# Patient Record
Sex: FEMALE | Race: WHITE | Employment: OTHER | URBAN - METROPOLITAN AREA
[De-identification: names, ages, dates, MRNs, and addresses within clinical notes are randomized per-mention and may not be internally consistent; named-entity substitution may affect disease eponyms.]

---

## 2019-12-05 ENCOUNTER — HOSPITAL ENCOUNTER (OUTPATIENT)
Age: 79
Discharge: HOME | End: 2019-12-05
Attending: EMERGENCY MEDICINE
Payer: MEDICARE

## 2019-12-05 ENCOUNTER — APPOINTMENT (OUTPATIENT)
Dept: GENERAL RADIOLOGY | Age: 79
End: 2019-12-05
Attending: EMERGENCY MEDICINE
Payer: MEDICARE

## 2019-12-05 VITALS
WEIGHT: 180 LBS | TEMPERATURE: 98 F | SYSTOLIC BLOOD PRESSURE: 161 MMHG | RESPIRATION RATE: 18 BRPM | BODY MASS INDEX: 29.99 KG/M2 | DIASTOLIC BLOOD PRESSURE: 56 MMHG | HEIGHT: 65 IN | HEART RATE: 70 BPM | OXYGEN SATURATION: 100 %

## 2019-12-05 DIAGNOSIS — M51.36 DEGENERATIVE DISC DISEASE, LUMBAR: ICD-10-CM

## 2019-12-05 DIAGNOSIS — M54.40 BACK PAIN OF LUMBAR REGION WITH SCIATICA: Primary | ICD-10-CM

## 2019-12-05 PROCEDURE — 72100 X-RAY EXAM L-S SPINE 2/3 VWS: CPT | Performed by: EMERGENCY MEDICINE

## 2019-12-05 PROCEDURE — 73502 X-RAY EXAM HIP UNI 2-3 VIEWS: CPT | Performed by: EMERGENCY MEDICINE

## 2019-12-05 PROCEDURE — 99203 OFFICE O/P NEW LOW 30 MIN: CPT

## 2019-12-05 PROCEDURE — 99204 OFFICE O/P NEW MOD 45 MIN: CPT

## 2019-12-05 RX ORDER — METHYLPREDNISOLONE 4 MG/1
TABLET ORAL
Qty: 1 PACKAGE | Refills: 0 | Status: SHIPPED | OUTPATIENT
Start: 2019-12-05 | End: 2020-01-18

## 2019-12-05 RX ORDER — TRAMADOL HYDROCHLORIDE 50 MG/1
TABLET ORAL EVERY 6 HOURS PRN
Qty: 10 TABLET | Refills: 0 | Status: SHIPPED | OUTPATIENT
Start: 2019-12-05 | End: 2019-12-12

## 2019-12-05 NOTE — ED PROVIDER NOTES
Patient Seen in: [address]      History   Patient presents with:  Back Pain    Stated Complaint: back pain    HPI    Patient is a 67-year-old female with a history of mild hypertension and high cholesterol who presents Genitourinary: Negative for decreased urine volume, difficulty urinating, dysuria, flank pain, frequency and hematuria. Musculoskeletal: Positive for back pain. Negative for arthralgias and gait problem. Skin: Negative for rash.    Neurological: Negativ Comments: Numbness to the left paraspinal lumbar region with radiation into the left buttock. Skin:     General: Skin is warm. Capillary Refill: Capillary refill takes less than 2 seconds. Neurological:      General: No focal deficit present.

## 2019-12-05 NOTE — ED INITIAL ASSESSMENT (HPI)
Uncomfortable sleeping last two nights, unable to lay down. Left lower back pain that radiates around to front left groin to top of thigh down to knee. Denies numbness to leg.  Took tylenol at 7:00am.

## 2019-12-10 PROBLEM — M19.072 PRIMARY OSTEOARTHRITIS OF LEFT FOOT: Status: ACTIVE | Noted: 2019-12-10

## 2020-01-09 ENCOUNTER — HOSPITAL (OUTPATIENT)
Dept: OTHER | Age: 80
End: 2020-01-09
Attending: INTERNAL MEDICINE

## 2020-01-18 RX ORDER — GABAPENTIN 300 MG/1
300 CAPSULE ORAL 2 TIMES DAILY
COMMUNITY
End: 2020-12-07 | Stop reason: ALTCHOICE

## 2020-01-18 RX ORDER — METOPROLOL SUCCINATE 100 MG/1
100 TABLET, EXTENDED RELEASE ORAL DAILY
COMMUNITY
End: 2020-12-11

## 2020-01-19 ENCOUNTER — LAB ENCOUNTER (OUTPATIENT)
Dept: LAB | Facility: HOSPITAL | Age: 80
End: 2020-01-19
Attending: ANESTHESIOLOGY
Payer: MEDICARE

## 2020-01-19 DIAGNOSIS — Z01.818 PRE-OP TESTING: ICD-10-CM

## 2020-01-19 PROCEDURE — 93005 ELECTROCARDIOGRAM TRACING: CPT

## 2020-01-19 PROCEDURE — 93010 ELECTROCARDIOGRAM REPORT: CPT | Performed by: ANESTHESIOLOGY

## 2020-01-21 ENCOUNTER — ANESTHESIA (OUTPATIENT)
Dept: MRI IMAGING | Facility: HOSPITAL | Age: 80
End: 2020-01-21
Payer: MEDICARE

## 2020-01-21 ENCOUNTER — ANESTHESIA EVENT (OUTPATIENT)
Dept: MRI IMAGING | Facility: HOSPITAL | Age: 80
End: 2020-01-21
Payer: MEDICARE

## 2020-01-21 ENCOUNTER — HOSPITAL ENCOUNTER (OUTPATIENT)
Dept: MRI IMAGING | Facility: HOSPITAL | Age: 80
Discharge: HOME | End: 2020-01-21
Attending: INTERNAL MEDICINE
Payer: MEDICARE

## 2020-01-21 VITALS
HEART RATE: 55 BPM | OXYGEN SATURATION: 97 % | BODY MASS INDEX: 30.37 KG/M2 | DIASTOLIC BLOOD PRESSURE: 69 MMHG | HEIGHT: 66 IN | WEIGHT: 189 LBS | SYSTOLIC BLOOD PRESSURE: 130 MMHG | RESPIRATION RATE: 14 BRPM

## 2020-01-21 DIAGNOSIS — M54.16 LUMBAR RADICULOPATHY: ICD-10-CM

## 2020-01-21 DIAGNOSIS — Z01.818 PRE-OP TESTING: Primary | ICD-10-CM

## 2020-01-21 PROCEDURE — 72148 MRI LUMBAR SPINE W/O DYE: CPT | Performed by: INTERNAL MEDICINE

## 2020-01-21 RX ORDER — LIDOCAINE HYDROCHLORIDE 10 MG/ML
INJECTION, SOLUTION EPIDURAL; INFILTRATION; INTRACAUDAL; PERINEURAL AS NEEDED
Status: DISCONTINUED | OUTPATIENT
Start: 2020-01-21 | End: 2020-01-21 | Stop reason: SURG

## 2020-01-21 RX ORDER — HYDROCODONE BITARTRATE AND ACETAMINOPHEN 5; 325 MG/1; MG/1
1 TABLET ORAL AS NEEDED
Status: DISCONTINUED | OUTPATIENT
Start: 2020-01-21 | End: 2020-01-23

## 2020-01-21 RX ORDER — FAMOTIDINE 20 MG/1
20 TABLET ORAL ONCE
Status: COMPLETED | OUTPATIENT
Start: 2020-01-21 | End: 2020-01-21

## 2020-01-21 RX ORDER — HALOPERIDOL 5 MG/ML
0.25 INJECTION INTRAMUSCULAR ONCE AS NEEDED
Status: ACTIVE | OUTPATIENT
Start: 2020-01-21 | End: 2020-01-21

## 2020-01-21 RX ORDER — PROCHLORPERAZINE EDISYLATE 5 MG/ML
5 INJECTION INTRAMUSCULAR; INTRAVENOUS ONCE AS NEEDED
Status: ACTIVE | OUTPATIENT
Start: 2020-01-21 | End: 2020-01-21

## 2020-01-21 RX ORDER — HYDROMORPHONE HYDROCHLORIDE 1 MG/ML
0.6 INJECTION, SOLUTION INTRAMUSCULAR; INTRAVENOUS; SUBCUTANEOUS EVERY 5 MIN PRN
Status: DISCONTINUED | OUTPATIENT
Start: 2020-01-21 | End: 2020-01-23

## 2020-01-21 RX ORDER — ACETAMINOPHEN 500 MG
1000 TABLET ORAL ONCE
Status: DISCONTINUED | OUTPATIENT
Start: 2020-01-21 | End: 2020-01-23

## 2020-01-21 RX ORDER — METOCLOPRAMIDE 10 MG/1
10 TABLET ORAL ONCE
Status: COMPLETED | OUTPATIENT
Start: 2020-01-21 | End: 2020-01-21

## 2020-01-21 RX ORDER — HYDROMORPHONE HYDROCHLORIDE 1 MG/ML
0.4 INJECTION, SOLUTION INTRAMUSCULAR; INTRAVENOUS; SUBCUTANEOUS EVERY 5 MIN PRN
Status: DISCONTINUED | OUTPATIENT
Start: 2020-01-21 | End: 2020-01-23

## 2020-01-21 RX ORDER — SODIUM CHLORIDE, SODIUM LACTATE, POTASSIUM CHLORIDE, CALCIUM CHLORIDE 600; 310; 30; 20 MG/100ML; MG/100ML; MG/100ML; MG/100ML
INJECTION, SOLUTION INTRAVENOUS CONTINUOUS
Status: DISCONTINUED | OUTPATIENT
Start: 2020-01-21 | End: 2020-01-23

## 2020-01-21 RX ORDER — MORPHINE SULFATE 4 MG/ML
4 INJECTION, SOLUTION INTRAMUSCULAR; INTRAVENOUS EVERY 10 MIN PRN
Status: DISCONTINUED | OUTPATIENT
Start: 2020-01-21 | End: 2020-01-23

## 2020-01-21 RX ORDER — MORPHINE SULFATE 4 MG/ML
2 INJECTION, SOLUTION INTRAMUSCULAR; INTRAVENOUS EVERY 10 MIN PRN
Status: DISCONTINUED | OUTPATIENT
Start: 2020-01-21 | End: 2020-01-23

## 2020-01-21 RX ORDER — NALOXONE HYDROCHLORIDE 0.4 MG/ML
80 INJECTION, SOLUTION INTRAMUSCULAR; INTRAVENOUS; SUBCUTANEOUS AS NEEDED
Status: ACTIVE | OUTPATIENT
Start: 2020-01-21 | End: 2020-01-21

## 2020-01-21 RX ORDER — HYDROMORPHONE HYDROCHLORIDE 1 MG/ML
0.2 INJECTION, SOLUTION INTRAMUSCULAR; INTRAVENOUS; SUBCUTANEOUS EVERY 5 MIN PRN
Status: DISCONTINUED | OUTPATIENT
Start: 2020-01-21 | End: 2020-01-23

## 2020-01-21 RX ORDER — HYDROCODONE BITARTRATE AND ACETAMINOPHEN 5; 325 MG/1; MG/1
2 TABLET ORAL AS NEEDED
Status: DISCONTINUED | OUTPATIENT
Start: 2020-01-21 | End: 2020-01-23

## 2020-01-21 RX ORDER — METOPROLOL TARTRATE 5 MG/5ML
2.5 INJECTION INTRAVENOUS ONCE
Status: DISCONTINUED | OUTPATIENT
Start: 2020-01-21 | End: 2020-01-23

## 2020-01-21 RX ORDER — MORPHINE SULFATE 10 MG/ML
6 INJECTION, SOLUTION INTRAMUSCULAR; INTRAVENOUS EVERY 10 MIN PRN
Status: DISCONTINUED | OUTPATIENT
Start: 2020-01-21 | End: 2020-01-23

## 2020-01-21 RX ORDER — ONDANSETRON 2 MG/ML
4 INJECTION INTRAMUSCULAR; INTRAVENOUS ONCE AS NEEDED
Status: ACTIVE | OUTPATIENT
Start: 2020-01-21 | End: 2020-01-21

## 2020-01-21 RX ADMIN — FAMOTIDINE 20 MG: 20 TABLET ORAL at 12:20:00

## 2020-01-21 RX ADMIN — METOCLOPRAMIDE 10 MG: 10 TABLET ORAL at 12:21:00

## 2020-01-21 RX ADMIN — SODIUM CHLORIDE, SODIUM LACTATE, POTASSIUM CHLORIDE, CALCIUM CHLORIDE: 600; 310; 30; 20 INJECTION, SOLUTION INTRAVENOUS at 14:51:00

## 2020-01-21 RX ADMIN — SODIUM CHLORIDE, SODIUM LACTATE, POTASSIUM CHLORIDE, CALCIUM CHLORIDE: 600; 310; 30; 20 INJECTION, SOLUTION INTRAVENOUS at 14:11:00

## 2020-01-21 RX ADMIN — SODIUM CHLORIDE, SODIUM LACTATE, POTASSIUM CHLORIDE, CALCIUM CHLORIDE: 600; 310; 30; 20 INJECTION, SOLUTION INTRAVENOUS at 12:40:00

## 2020-01-21 RX ADMIN — LIDOCAINE HYDROCHLORIDE 50 MG: 10 INJECTION, SOLUTION EPIDURAL; INFILTRATION; INTRACAUDAL; PERINEURAL at 14:11:00

## 2020-01-21 NOTE — ANESTHESIA PREPROCEDURE EVALUATION
Anesthesia PreOp Note    HPI:     Madhu Lopez is a 79year old female who presents for preoperative consultation requested by: * No surgeons listed *    Date of Surgery: 1/21/2020    * No procedures listed *  Indication: * No pre-op diagnosis ente mL/hr at 01/21/20 1240  acetaminophen (TYLENOL EXTRA STRENGTH) tab 1,000 mg, 1,000 mg, Oral, Once, Kushal HAJI MD  metoprolol Tartrate (LOPRESSOR) tab 25 mg, 25 mg, Oral, Once PRN, Preethi Lo MD Rudene Sidles Aubery Herbert*    HIVES  Sulfa Antibiot Not on file        Forced sexual activity: Not on file    Other Topics      Concerns:        Not on file    Social History Narrative      Not on file      Available pre-op labs reviewed. Vital Signs: Body mass index is 30.51 kg/m².    height is

## 2020-01-21 NOTE — ANESTHESIA POSTPROCEDURE EVALUATION
Patient: Kirti Obrien    Procedure Summary     Date:  01/21/20 Room / Location:  [facility] MRI; [address] Anesthesia Care Unit    Anesthesia Start:  1973 Anesthesia Stop:  8410    Procedure:  MRI SPINE LUMBAR (CPT=72148) Merlin Handing

## 2020-01-21 NOTE — ANESTHESIA PROCEDURE NOTES
Airway  Date/Time: 1/21/2020 1:57 PM  Urgency: Elective    Airway not difficult    General Information and Staff    Patient location during procedure: OR  Anesthesiologist: Alia Velasquez MD  Performed: anesthesiologist     Indications and Patient Condition

## 2020-01-29 ENCOUNTER — OFFICE VISIT (OUTPATIENT)
Dept: NEUROLOGY | Facility: CLINIC | Age: 80
End: 2020-01-29
Payer: MEDICARE

## 2020-01-29 ENCOUNTER — TELEPHONE (OUTPATIENT)
Dept: NEUROLOGY | Facility: CLINIC | Age: 80
End: 2020-01-29

## 2020-01-29 VITALS
BODY MASS INDEX: 28.93 KG/M2 | SYSTOLIC BLOOD PRESSURE: 130 MMHG | DIASTOLIC BLOOD PRESSURE: 80 MMHG | HEIGHT: 66 IN | WEIGHT: 180 LBS

## 2020-01-29 DIAGNOSIS — M48.062 SPINAL STENOSIS OF LUMBAR REGION WITH NEUROGENIC CLAUDICATION: Primary | ICD-10-CM

## 2020-01-29 PROCEDURE — 99204 OFFICE O/P NEW MOD 45 MIN: CPT | Performed by: PHYSICAL MEDICINE & REHABILITATION

## 2020-01-29 NOTE — TELEPHONE ENCOUNTER
Patient has been scheduled for a Left L3 (L3-4), L4 (L4-5) transforaminal epidural steroid injections   on 02/13/20 at the [facility]. Medications and allergies reviewed.  Patient informed to hold aspirins, nsaids, blood thinners, multivitamins, vitamin E and fis

## 2020-01-29 NOTE — TELEPHONE ENCOUNTER
Left L3 (L3-4), L4 (L4-5) TFESI-APPROVED    Medicare Online for authorization of procedure   Left L3 (L3-4), L4 (L4-5) TFESI CPT codes: 38893,13501. Procedure is a covered benefit and does not require authorization. Will inform Nursing.

## 2020-01-29 NOTE — PROGRESS NOTES
[address]  Progress Note    CHIEF COMPLAINT:  Patient presents with:  Low Back Pain: Patient referred by Dr. Murali Sweeney presents for low back pain that started 12/05/19.  Patient states that she was putting ch History    Occupational History      Not on file    Tobacco Use      Smoking status: Former Smoker      Smokeless tobacco: Never Used      Tobacco comment: quit at age 41--smoked for 20yrs    Substance and Sexual Activity      Alcohol use: Yes        Comme Wt 180 lb (81.6 kg)   LMP 10/20/1993 (Exact Date)   BMI 29.05 kg/m²     Body mass index is 29.05 kg/m². General: No immediate distress  Head: Normocephalic/ Atraumatic  Eyes: Extra-occular movements intact.    Ears: No auricular hematoma or deformities

## 2020-01-30 ENCOUNTER — MED REC SCAN ONLY (OUTPATIENT)
Dept: NEUROLOGY | Facility: CLINIC | Age: 80
End: 2020-01-30

## 2020-02-04 PROBLEM — M48.062 SPINAL STENOSIS OF LUMBAR REGION WITH NEUROGENIC CLAUDICATION: Status: ACTIVE | Noted: 2020-02-04

## 2020-02-13 ENCOUNTER — OFFICE VISIT (OUTPATIENT)
Dept: SURGERY | Facility: CLINIC | Age: 80
End: 2020-02-13
Payer: MEDICARE

## 2020-02-13 DIAGNOSIS — M48.062 SPINAL STENOSIS OF LUMBAR REGION WITH NEUROGENIC CLAUDICATION: Primary | ICD-10-CM

## 2020-02-13 PROCEDURE — 64484 NJX AA&/STRD TFRM EPI L/S EA: CPT | Performed by: PHYSICAL MEDICINE & REHABILITATION

## 2020-02-13 PROCEDURE — 64483 NJX AA&/STRD TFRM EPI L/S 1: CPT | Performed by: PHYSICAL MEDICINE & REHABILITATION

## 2020-02-15 PROBLEM — I12.9 HYPERTENSIVE RENAL DISEASE: Status: ACTIVE | Noted: 2019-05-18

## 2020-02-15 PROBLEM — I48.91 ATRIAL FIBRILLATION (HCC): Status: ACTIVE | Noted: 2019-05-18

## 2020-02-15 NOTE — PROCEDURES
Preoperative Diagnosis:  (W30.715) Spinal stenosis of lumbar region with neurogenic claudication  (primary encounter diagnosis)       Postoperative Diagnosis:  (E13.801) Spinal stenosis of lumbar region with neurogenic claudication  (primary encounter diag

## 2020-04-29 ENCOUNTER — TELEPHONE (OUTPATIENT)
Dept: NEUROLOGY | Facility: CLINIC | Age: 80
End: 2020-04-29

## 2020-04-30 ENCOUNTER — TELEMEDICINE (OUTPATIENT)
Dept: NEUROLOGY | Facility: CLINIC | Age: 80
End: 2020-04-30

## 2020-04-30 DIAGNOSIS — I48.0 PAROXYSMAL ATRIAL FIBRILLATION (HCC): ICD-10-CM

## 2020-04-30 DIAGNOSIS — M96.1 POST LAMINECTOMY SYNDROME: Primary | ICD-10-CM

## 2020-04-30 DIAGNOSIS — N18.30 CHRONIC RENAL INSUFFICIENCY, STAGE III (MODERATE) (HCC): ICD-10-CM

## 2020-04-30 DIAGNOSIS — E66.3 PATIENT OVERWEIGHT: ICD-10-CM

## 2020-04-30 PROBLEM — M54.16 LUMBAR RADICULOPATHY: Status: ACTIVE | Noted: 2020-04-30

## 2020-04-30 PROCEDURE — 99213 OFFICE O/P EST LOW 20 MIN: CPT | Performed by: PHYSICAL MEDICINE & REHABILITATION

## 2020-04-30 RX ORDER — VALACYCLOVIR HYDROCHLORIDE 1 G/1
TABLET, FILM COATED ORAL
COMMUNITY
End: 2021-04-21

## 2020-04-30 RX ORDER — LORAZEPAM 1 MG/1
TABLET ORAL
COMMUNITY
End: 2020-09-21 | Stop reason: ALTCHOICE

## 2020-04-30 RX ORDER — MONTELUKAST SODIUM 10 MG/1
TABLET ORAL
COMMUNITY
End: 2020-09-21 | Stop reason: ALTCHOICE

## 2020-04-30 RX ORDER — FEXOFENADINE HCL 180 MG/1
TABLET ORAL
COMMUNITY
End: 2021-07-29

## 2020-04-30 RX ORDER — TRAMADOL HYDROCHLORIDE 50 MG/1
TABLET ORAL
COMMUNITY
End: 2020-09-21 | Stop reason: ALTCHOICE

## 2020-04-30 RX ORDER — EZETIMIBE 10 MG/1
TABLET ORAL
COMMUNITY
End: 2020-09-21

## 2020-04-30 RX ORDER — NITROFURANTOIN 25; 75 MG/1; MG/1
100 CAPSULE ORAL 2 TIMES DAILY
COMMUNITY
Start: 2020-01-07 | End: 2020-09-21 | Stop reason: ALTCHOICE

## 2020-04-30 NOTE — PROGRESS NOTES
[address] [facility]    Telemedicine Visit - New Evaluation    Stephanie Samueloscar Guthriemadalyn verbally consents to a Telemedicine Visit on 04/30/20.  This visit is conducted using Telemedicine with live, interactive audio and vi sitting alleviates it. Pain is approximately 4/10 and intermittent depending on activities.        PAST MEDICAL HISTORY:     Past Medical History:   Diagnosis Date   • Anesthesia complication    • Arrhythmia     atrial tachycardia   • Back problem    • HEA heartburn: denies    Genitourinary: Bladder incontinence: denies    Musculoskeletal: As per HPI    Neurological: As per HPI        PHYSICAL EXAM:   Constitutional: Healthy appearing, well-developed, no acute distress  Eyes: Conjunctivae are clear, extra-oc may significantly decrease immune response and may increase risk and complications of infection. The patient was advised that given the current situation with COVID-19, it is in his/her best interest to socially distance his/herself.  Given this, we are

## 2020-08-20 ENCOUNTER — APPOINTMENT (OUTPATIENT)
Dept: GENERAL RADIOLOGY | Facility: HOSPITAL | Age: 80
End: 2020-08-20
Payer: MEDICARE

## 2020-08-20 ENCOUNTER — HOSPITAL ENCOUNTER (EMERGENCY)
Facility: HOSPITAL | Age: 80
Discharge: HOME | End: 2020-08-20
Attending: EMERGENCY MEDICINE
Payer: MEDICARE

## 2020-08-20 VITALS
SYSTOLIC BLOOD PRESSURE: 157 MMHG | OXYGEN SATURATION: 98 % | TEMPERATURE: 98 F | HEART RATE: 56 BPM | DIASTOLIC BLOOD PRESSURE: 79 MMHG | WEIGHT: 185 LBS | HEIGHT: 65 IN | BODY MASS INDEX: 30.82 KG/M2 | RESPIRATION RATE: 18 BRPM

## 2020-08-20 DIAGNOSIS — R07.89 CHEST PRESSURE: Primary | ICD-10-CM

## 2020-08-20 DIAGNOSIS — I10 UNCONTROLLED HYPERTENSION: ICD-10-CM

## 2020-08-20 LAB
ANION GAP SERPL CALC-SCNC: 8 MMOL/L (ref 0–18)
BASOPHILS # BLD AUTO: 0.06 X10(3) UL (ref 0–0.2)
BASOPHILS NFR BLD AUTO: 0.8 %
BUN BLD-MCNC: 28 MG/DL (ref 7–18)
BUN/CREAT SERPL: 17.5 (ref 10–20)
CALCIUM BLD-MCNC: 9.2 MG/DL (ref 8.5–10.1)
CHLORIDE SERPL-SCNC: 91 MMOL/L (ref 98–112)
CO2 SERPL-SCNC: 27 MMOL/L (ref 21–32)
CREAT BLD-MCNC: 1.6 MG/DL (ref 0.55–1.02)
DEPRECATED RDW RBC AUTO: 47.2 FL (ref 35.1–46.3)
EOSINOPHIL # BLD AUTO: 0.15 X10(3) UL (ref 0–0.7)
EOSINOPHIL NFR BLD AUTO: 1.9 %
ERYTHROCYTE [DISTWIDTH] IN BLOOD BY AUTOMATED COUNT: 14.2 % (ref 11–15)
GLUCOSE BLD-MCNC: 97 MG/DL (ref 70–99)
HCT VFR BLD AUTO: 32.7 % (ref 35–48)
HGB BLD-MCNC: 11.3 G/DL (ref 12–16)
IMM GRANULOCYTES # BLD AUTO: 0.01 X10(3) UL (ref 0–1)
IMM GRANULOCYTES NFR BLD: 0.1 %
LYMPHOCYTES # BLD AUTO: 4.02 X10(3) UL (ref 1–4)
LYMPHOCYTES NFR BLD AUTO: 52 %
MCH RBC QN AUTO: 31.3 PG (ref 26–34)
MCHC RBC AUTO-ENTMCNC: 34.6 G/DL (ref 31–37)
MCV RBC AUTO: 90.6 FL (ref 80–100)
MONOCYTES # BLD AUTO: 0.62 X10(3) UL (ref 0.1–1)
MONOCYTES NFR BLD AUTO: 8 %
NEUTROPHILS # BLD AUTO: 2.87 X10 (3) UL (ref 1.5–7.7)
NEUTROPHILS # BLD AUTO: 2.87 X10(3) UL (ref 1.5–7.7)
NEUTROPHILS NFR BLD AUTO: 37.2 %
OSMOLALITY SERPL CALC.SUM OF ELEC: 267 MOSM/KG (ref 275–295)
PLATELET # BLD AUTO: 188 10(3)UL (ref 150–450)
POTASSIUM SERPL-SCNC: 5 MMOL/L (ref 3.5–5.1)
RBC # BLD AUTO: 3.61 X10(6)UL (ref 3.8–5.3)
SODIUM SERPL-SCNC: 126 MMOL/L (ref 136–145)
TROPONIN I SERPL-MCNC: <0.045 NG/ML (ref ?–0.04)
WBC # BLD AUTO: 7.7 X10(3) UL (ref 4–11)

## 2020-08-20 PROCEDURE — 85025 COMPLETE CBC W/AUTO DIFF WBC: CPT | Performed by: EMERGENCY MEDICINE

## 2020-08-20 PROCEDURE — 93005 ELECTROCARDIOGRAM TRACING: CPT

## 2020-08-20 PROCEDURE — 80048 BASIC METABOLIC PNL TOTAL CA: CPT | Performed by: EMERGENCY MEDICINE

## 2020-08-20 PROCEDURE — 99284 EMERGENCY DEPT VISIT MOD MDM: CPT

## 2020-08-20 PROCEDURE — 85025 COMPLETE CBC W/AUTO DIFF WBC: CPT

## 2020-08-20 PROCEDURE — 71045 X-RAY EXAM CHEST 1 VIEW: CPT | Performed by: EMERGENCY MEDICINE

## 2020-08-20 PROCEDURE — 80048 BASIC METABOLIC PNL TOTAL CA: CPT

## 2020-08-20 PROCEDURE — 84484 ASSAY OF TROPONIN QUANT: CPT | Performed by: EMERGENCY MEDICINE

## 2020-08-20 PROCEDURE — 36415 COLL VENOUS BLD VENIPUNCTURE: CPT

## 2020-08-20 PROCEDURE — 93010 ELECTROCARDIOGRAM REPORT: CPT | Performed by: EMERGENCY MEDICINE

## 2020-08-20 PROCEDURE — 84484 ASSAY OF TROPONIN QUANT: CPT

## 2020-08-20 NOTE — ED INITIAL ASSESSMENT (HPI)
Pt sts she was placed on a 4th BP medication on Monday and began having chest pressure, lightheadedness, fatigue since today. Denies cough, fever, Sob. No further complaints. A/ox4, respirations unlabored, speech full/clear, gait steady, NAD.

## 2020-08-21 NOTE — ED PROVIDER NOTES
Patient Seen in: [facility] Emergency Department    History   Patient presents with:  Chest Pain Angina      HPI    Patient presents to the ED complaining of feeling lightheaded and fatigued ever since starting a new blood pressure medication 4 da live in a home with stairs. ROS  Pertinent Positives: Fatigue and lightheaded, elevated blood pressure  All other organ systems are reviewed and are negative. Constitutional and vital signs reviewed.       Social History and Family History elements Value    Sodium 126 (*)     Chloride 91 (*)     BUN 28 (*)     Creatinine 1.60 (*)     Calculated Osmolality 267 (*)     GFR, Non- 30 (*)     GFR, -American 35 (*)     All other components within normal limits   CBC W/ DIFFERENTIAL - (36.8 °C)     TempSrc: Oral     SpO2: 99% 99% 98%   Weight: 83.9 kg     Height: 165.1 cm (5' 5\")       *I personally reviewed and interpreted all ED vitals.     Pulse Ox: 98%, Room air, Normal     Monitor Interpretation:   normal sinus rhythm, sinus bradyc

## 2020-08-27 ENCOUNTER — HOSPITAL ENCOUNTER (OUTPATIENT)
Dept: CT IMAGING | Facility: HOSPITAL | Age: 80
Discharge: HOME | End: 2020-08-27
Attending: INTERNAL MEDICINE
Payer: MEDICARE

## 2020-08-27 DIAGNOSIS — F44.89 CONFUSIONAL STATE: ICD-10-CM

## 2020-08-27 PROCEDURE — 70450 CT HEAD/BRAIN W/O DYE: CPT | Performed by: INTERNAL MEDICINE

## 2020-09-12 ENCOUNTER — APPOINTMENT (OUTPATIENT)
Dept: GENERAL RADIOLOGY | Age: 80
End: 2020-09-12
Attending: EMERGENCY MEDICINE

## 2020-09-12 ENCOUNTER — HOSPITAL ENCOUNTER (EMERGENCY)
Age: 80
Discharge: HOME | End: 2020-09-12
Attending: EMERGENCY MEDICINE

## 2020-09-12 ENCOUNTER — APPOINTMENT (OUTPATIENT)
Dept: CT IMAGING | Age: 80
End: 2020-09-12
Attending: EMERGENCY MEDICINE

## 2020-09-12 VITALS
DIASTOLIC BLOOD PRESSURE: 66 MMHG | WEIGHT: 189.6 LBS | RESPIRATION RATE: 13 BRPM | BODY MASS INDEX: 31.59 KG/M2 | OXYGEN SATURATION: 100 % | SYSTOLIC BLOOD PRESSURE: 173 MMHG | TEMPERATURE: 97.4 F | HEART RATE: 53 BPM | HEIGHT: 65 IN

## 2020-09-12 DIAGNOSIS — I15.9 SECONDARY HYPERTENSION: Primary | ICD-10-CM

## 2020-09-12 DIAGNOSIS — E87.1 HYPONATREMIA: ICD-10-CM

## 2020-09-12 LAB
ALBUMIN SERPL-MCNC: 3.9 G/DL (ref 3.6–5.1)
ALBUMIN/GLOB SERPL: 1 {RATIO} (ref 1–2.4)
ALP SERPL-CCNC: 64 UNITS/L (ref 45–117)
ALT SERPL-CCNC: 41 UNITS/L
ANION GAP SERPL CALC-SCNC: 10 MMOL/L (ref 10–20)
APPEARANCE UR: CLEAR
AST SERPL-CCNC: 40 UNITS/L
BASOPHILS # BLD: 0.1 K/MCL (ref 0–0.3)
BASOPHILS NFR BLD: 1 %
BILIRUB SERPL-MCNC: 0.7 MG/DL (ref 0.2–1)
BILIRUB UR QL STRIP: NEGATIVE
BUN SERPL-MCNC: 38 MG/DL (ref 6–20)
BUN/CREAT SERPL: 25 (ref 7–25)
CALCIUM SERPL-MCNC: 9.3 MG/DL (ref 8.4–10.2)
CHLORIDE SERPL-SCNC: 98 MMOL/L (ref 98–107)
CO2 SERPL-SCNC: 26 MMOL/L (ref 21–32)
COLOR UR: ABNORMAL
CREAT SERPL-MCNC: 1.54 MG/DL (ref 0.51–0.95)
DIFFERENTIAL METHOD BLD: ABNORMAL
EOSINOPHIL # BLD: 0.2 K/MCL (ref 0.1–0.5)
EOSINOPHIL NFR BLD: 3 %
ERYTHROCYTE [DISTWIDTH] IN BLOOD: 14.6 % (ref 11–15)
GLOBULIN SER-MCNC: 3.9 G/DL (ref 2–4)
GLUCOSE SERPL-MCNC: 100 MG/DL (ref 65–99)
GLUCOSE UR STRIP-MCNC: NEGATIVE MG/DL
HCT VFR BLD CALC: 34.3 % (ref 36–46.5)
HGB BLD-MCNC: 11.3 G/DL (ref 12–15.5)
HGB UR QL STRIP: NEGATIVE
IMM GRANULOCYTES # BLD AUTO: 0 K/MCL (ref 0–0.2)
IMM GRANULOCYTES NFR BLD: 0 %
KETONES UR STRIP-MCNC: NEGATIVE MG/DL
LEUKOCYTE ESTERASE UR QL STRIP: NEGATIVE
LIPASE SERPL-CCNC: 205 UNITS/L (ref 73–393)
LYMPHOCYTES # BLD: 4.8 K/MCL (ref 1–4)
LYMPHOCYTES NFR BLD: 49 %
MAGNESIUM SERPL-MCNC: 2.3 MG/DL (ref 1.7–2.4)
MCH RBC QN AUTO: 31.2 PG (ref 26–34)
MCHC RBC AUTO-ENTMCNC: 32.9 G/DL (ref 32–36.5)
MCV RBC AUTO: 94.8 FL (ref 78–100)
MONOCYTES # BLD: 0.8 K/MCL (ref 0.3–0.9)
MONOCYTES NFR BLD: 8 %
NEUTROPHILS # BLD: 3.8 K/MCL (ref 1.8–7.7)
NEUTROPHILS NFR BLD: 39 %
NITRITE UR QL STRIP: NEGATIVE
NRBC BLD MANUAL-RTO: 0 /100 WBC
PH UR STRIP: 5 UNITS (ref 5–7)
PLATELET # BLD: 207 K/MCL (ref 140–450)
POTASSIUM SERPL-SCNC: 4.8 MMOL/L (ref 3.4–5.1)
PROT SERPL-MCNC: 7.8 G/DL (ref 6.4–8.2)
PROT UR STRIP-MCNC: NEGATIVE MG/DL
RBC # BLD: 3.62 MIL/MCL (ref 4–5.2)
SODIUM SERPL-SCNC: 129 MMOL/L (ref 135–145)
SP GR UR STRIP: 1.01 (ref 1–1.03)
SPECIMEN SOURCE: ABNORMAL
TROPONIN I SERPL HS-MCNC: <0.02 NG/ML
UROBILINOGEN UR STRIP-MCNC: 0.2 MG/DL (ref 0–1)
WBC # BLD: 9.7 K/MCL (ref 4.2–11)

## 2020-09-12 PROCEDURE — 93005 ELECTROCARDIOGRAM TRACING: CPT | Performed by: EMERGENCY MEDICINE

## 2020-09-12 PROCEDURE — 71045 X-RAY EXAM CHEST 1 VIEW: CPT

## 2020-09-12 PROCEDURE — 83735 ASSAY OF MAGNESIUM: CPT

## 2020-09-12 PROCEDURE — 36415 COLL VENOUS BLD VENIPUNCTURE: CPT

## 2020-09-12 PROCEDURE — 84484 ASSAY OF TROPONIN QUANT: CPT

## 2020-09-12 PROCEDURE — 81003 URINALYSIS AUTO W/O SCOPE: CPT

## 2020-09-12 PROCEDURE — 83690 ASSAY OF LIPASE: CPT

## 2020-09-12 PROCEDURE — 85025 COMPLETE CBC W/AUTO DIFF WBC: CPT

## 2020-09-12 PROCEDURE — 70450 CT HEAD/BRAIN W/O DYE: CPT

## 2020-09-12 PROCEDURE — 80053 COMPREHEN METABOLIC PANEL: CPT

## 2020-09-12 PROCEDURE — 99284 EMERGENCY DEPT VISIT MOD MDM: CPT

## 2020-09-12 SDOH — HEALTH STABILITY: MENTAL HEALTH: HOW OFTEN DO YOU HAVE A DRINK CONTAINING ALCOHOL?: NEVER

## 2020-09-12 ASSESSMENT — ENCOUNTER SYMPTOMS
BACK PAIN: 0
EYE REDNESS: 0
ABDOMINAL PAIN: 0
DIZZINESS: 1
FEVER: 0
AGITATION: 0
COUGH: 0

## 2020-09-12 ASSESSMENT — PAIN SCALES - GENERAL: PAINLEVEL_OUTOF10: 0

## 2020-09-13 LAB
ATRIAL RATE (BPM): 51
P AXIS (DEGREES): 40
PR-INTERVAL (MSEC): 232
QRS-INTERVAL (MSEC): 104
QT-INTERVAL (MSEC): 462
QTC: 425
R AXIS (DEGREES): -31
REPORT TEXT: NORMAL
T AXIS (DEGREES): 18
VENTRICULAR RATE EKG/MIN (BPM): 51

## 2020-12-07 PROBLEM — M41.9 SCOLIOSIS: Status: ACTIVE | Noted: 2020-12-07

## 2020-12-07 PROBLEM — Z86.2 HISTORY OF LEUKOCYTOSIS: Status: ACTIVE | Noted: 2020-02-01

## 2020-12-07 PROBLEM — I44.0 FIRST DEGREE AV BLOCK: Status: ACTIVE | Noted: 2020-12-07

## 2020-12-07 PROBLEM — I67.9 CEREBROVASCULAR DISEASE: Status: ACTIVE | Noted: 2020-08-27

## 2020-12-11 PROBLEM — I10 ESSENTIAL HYPERTENSION: Status: ACTIVE | Noted: 2020-12-11

## 2020-12-11 PROBLEM — N18.4 CKD (CHRONIC KIDNEY DISEASE) STAGE 4, GFR 15-29 ML/MIN (HCC): Status: ACTIVE | Noted: 2020-12-11

## 2020-12-11 PROBLEM — E87.5 HYPERKALEMIA: Status: ACTIVE | Noted: 2020-12-11

## 2021-01-08 PROBLEM — E87.5 HYPERKALEMIA: Status: RESOLVED | Noted: 2020-12-11 | Resolved: 2021-01-08

## 2021-02-09 DIAGNOSIS — Z23 NEED FOR VACCINATION: ICD-10-CM

## 2021-04-20 ENCOUNTER — LAB ENCOUNTER (OUTPATIENT)
Dept: LAB | Facility: HOSPITAL | Age: 81
End: 2021-04-20
Attending: ORTHOPAEDIC SURGERY
Payer: MEDICARE

## 2021-04-20 DIAGNOSIS — Z01.818 PREOP EXAMINATION: ICD-10-CM

## 2021-04-20 DIAGNOSIS — Z11.59 ENCOUNTER FOR SCREENING FOR OTHER VIRAL DISEASES: ICD-10-CM

## 2021-04-21 RX ORDER — GABAPENTIN 100 MG/1
100 CAPSULE ORAL NIGHTLY
COMMUNITY
End: 2021-05-01

## 2021-04-21 RX ORDER — GABAPENTIN 100 MG/1
200 CAPSULE ORAL EVERY MORNING
COMMUNITY
End: 2021-05-01

## 2021-04-21 RX ORDER — ACETAMINOPHEN 500 MG
500 TABLET ORAL EVERY 4 HOURS PRN
COMMUNITY

## 2021-04-22 NOTE — PAT NURSING NOTE
Anna from Dr Raul Nixon called said pt has appt tomorrow at 1030am for H and P and she called the pt.
Call received from [place] at MRI to ff up on the H & P prior to procedure tomorrow. This RN spoke with the pt at [phone] , still waiting for her PCP office to call her back .  She said she spoke with NP Sharri Castorena around 1200 who will discuss with Dr. Huan Delgado
S/W Aisha Parsons from Dr Bowen Barajas An ofc, re: H and P of pt. She will f/u with the pt's PCP and will call us back. Ofc aware that pt scheduled for MRI tmorrow 4/23/21.
Spoke with Dakota Smalls from MRI, updated on need to get H & P . According to her, will keep pt on the schedule in case PCP is able to do one tomorrow morning . This RN called the pt with updates .  Instructed to call PCP early tomorrow to check if they can fit h
What Type Of Note Output Would You Prefer (Optional)?: Bullet Format
Is The Patient Presenting As Previously Scheduled?: Yes
How Severe Is Your Rash?: mild
Is This A New Presentation, Or A Follow-Up?: Rash

## 2021-04-23 ENCOUNTER — ANESTHESIA EVENT (OUTPATIENT)
Dept: MRI IMAGING | Facility: HOSPITAL | Age: 81
End: 2021-04-23
Payer: MEDICARE

## 2021-04-23 ENCOUNTER — HOSPITAL ENCOUNTER (OUTPATIENT)
Dept: MRI IMAGING | Facility: HOSPITAL | Age: 81
Discharge: HOME | End: 2021-04-23
Attending: ORTHOPAEDIC SURGERY
Payer: MEDICARE

## 2021-04-23 ENCOUNTER — ANESTHESIA (OUTPATIENT)
Dept: MRI IMAGING | Facility: HOSPITAL | Age: 81
End: 2021-04-23
Payer: MEDICARE

## 2021-04-23 VITALS
HEIGHT: 64 IN | SYSTOLIC BLOOD PRESSURE: 157 MMHG | TEMPERATURE: 98 F | WEIGHT: 186 LBS | DIASTOLIC BLOOD PRESSURE: 49 MMHG | OXYGEN SATURATION: 96 % | RESPIRATION RATE: 14 BRPM | BODY MASS INDEX: 31.76 KG/M2 | HEART RATE: 50 BPM

## 2021-04-23 DIAGNOSIS — M48.07 LUMBOSACRAL STENOSIS: ICD-10-CM

## 2021-04-23 PROCEDURE — 72148 MRI LUMBAR SPINE W/O DYE: CPT | Performed by: ORTHOPAEDIC SURGERY

## 2021-04-23 RX ORDER — ACETAMINOPHEN 500 MG
1000 TABLET ORAL ONCE
Status: DISCONTINUED | OUTPATIENT
Start: 2021-04-23 | End: 2021-04-25

## 2021-04-23 RX ORDER — MORPHINE SULFATE 4 MG/ML
2 INJECTION, SOLUTION INTRAMUSCULAR; INTRAVENOUS EVERY 10 MIN PRN
Status: CANCELLED | OUTPATIENT
Start: 2021-04-23

## 2021-04-23 RX ORDER — ONDANSETRON 2 MG/ML
4 INJECTION INTRAMUSCULAR; INTRAVENOUS ONCE AS NEEDED
Status: CANCELLED | OUTPATIENT
Start: 2021-04-23 | End: 2021-04-23

## 2021-04-23 RX ORDER — NALOXONE HYDROCHLORIDE 0.4 MG/ML
80 INJECTION, SOLUTION INTRAMUSCULAR; INTRAVENOUS; SUBCUTANEOUS AS NEEDED
Status: CANCELLED | OUTPATIENT
Start: 2021-04-23 | End: 2021-04-23

## 2021-04-23 RX ORDER — PROCHLORPERAZINE EDISYLATE 5 MG/ML
5 INJECTION INTRAMUSCULAR; INTRAVENOUS ONCE AS NEEDED
Status: CANCELLED | OUTPATIENT
Start: 2021-04-23 | End: 2021-04-23

## 2021-04-23 RX ORDER — METOCLOPRAMIDE 10 MG/1
10 TABLET ORAL ONCE
Status: COMPLETED | OUTPATIENT
Start: 2021-04-23 | End: 2021-04-23

## 2021-04-23 RX ORDER — SODIUM CHLORIDE, SODIUM LACTATE, POTASSIUM CHLORIDE, CALCIUM CHLORIDE 600; 310; 30; 20 MG/100ML; MG/100ML; MG/100ML; MG/100ML
INJECTION, SOLUTION INTRAVENOUS CONTINUOUS
Status: CANCELLED | OUTPATIENT
Start: 2021-04-23

## 2021-04-23 RX ORDER — LIDOCAINE HYDROCHLORIDE 10 MG/ML
INJECTION, SOLUTION EPIDURAL; INFILTRATION; INTRACAUDAL; PERINEURAL AS NEEDED
Status: DISCONTINUED | OUTPATIENT
Start: 2021-04-23 | End: 2021-04-23 | Stop reason: SURG

## 2021-04-23 RX ORDER — HYDROMORPHONE HYDROCHLORIDE 1 MG/ML
0.2 INJECTION, SOLUTION INTRAMUSCULAR; INTRAVENOUS; SUBCUTANEOUS EVERY 5 MIN PRN
Status: CANCELLED | OUTPATIENT
Start: 2021-04-23

## 2021-04-23 RX ORDER — ONDANSETRON 2 MG/ML
INJECTION INTRAMUSCULAR; INTRAVENOUS AS NEEDED
Status: DISCONTINUED | OUTPATIENT
Start: 2021-04-23 | End: 2021-04-23 | Stop reason: SURG

## 2021-04-23 RX ORDER — FAMOTIDINE 20 MG/1
20 TABLET ORAL ONCE
Status: COMPLETED | OUTPATIENT
Start: 2021-04-23 | End: 2021-04-23

## 2021-04-23 RX ORDER — SODIUM CHLORIDE, SODIUM LACTATE, POTASSIUM CHLORIDE, CALCIUM CHLORIDE 600; 310; 30; 20 MG/100ML; MG/100ML; MG/100ML; MG/100ML
INJECTION, SOLUTION INTRAVENOUS CONTINUOUS
Status: DISCONTINUED | OUTPATIENT
Start: 2021-04-23 | End: 2021-04-25

## 2021-04-23 RX ADMIN — LIDOCAINE HYDROCHLORIDE 50 MG: 10 INJECTION, SOLUTION EPIDURAL; INFILTRATION; INTRACAUDAL; PERINEURAL at 14:48:00

## 2021-04-23 RX ADMIN — SODIUM CHLORIDE, SODIUM LACTATE, POTASSIUM CHLORIDE, CALCIUM CHLORIDE: 600; 310; 30; 20 INJECTION, SOLUTION INTRAVENOUS at 15:11:00

## 2021-04-23 RX ADMIN — FAMOTIDINE 20 MG: 20 TABLET ORAL at 13:13:00

## 2021-04-23 RX ADMIN — METOCLOPRAMIDE 10 MG: 10 TABLET ORAL at 13:13:00

## 2021-04-23 RX ADMIN — SODIUM CHLORIDE, SODIUM LACTATE, POTASSIUM CHLORIDE, CALCIUM CHLORIDE: 600; 310; 30; 20 INJECTION, SOLUTION INTRAVENOUS at 13:15:00

## 2021-04-23 RX ADMIN — ONDANSETRON 4 MG: 2 INJECTION INTRAMUSCULAR; INTRAVENOUS at 15:35:00

## 2021-04-23 RX ADMIN — SODIUM CHLORIDE, SODIUM LACTATE, POTASSIUM CHLORIDE, CALCIUM CHLORIDE: 600; 310; 30; 20 INJECTION, SOLUTION INTRAVENOUS at 14:39:00

## 2021-04-23 NOTE — ANESTHESIA POSTPROCEDURE EVALUATION
Patient: Madhu Lopez    Procedure Summary     Date: 04/23/21 Room / Location: [facility] MRI; [address] Anesthesia Care Unit    Anesthesia Start: 4787 Anesthesia Stop: 5963    Procedure: MRI SPINE LUMBAR (VIN=56158) Diagnosis:

## 2021-04-23 NOTE — ANESTHESIA PREPROCEDURE EVALUATION
Anesthesia PreOp Note    HPI:     Deangelo White is a [de-identified]year old female who presents for preoperative consultation requested by: * No surgeons listed *    Date of Surgery: 4/23/2021    * No procedures listed *  Indication: * No pre-op diagnosis ente COLONOSCOPY  2010    wnl - next due in 2015   • [address]  2020   • TONSILLECTOMY     • [address]       (Not in a hospital admission)    acetaminophen 500 MG Oral Tab, Take 500 mg by mouth every 4 (four) hours as needed fo Other (Other) Father         brain tumor   • Stroke Mother    • Hypertension Sister      Social History    Socioeconomic History      Marital status:       Spouse name: Not on file      Number of children: Not on file      Years of education: Not on of Social Gatherings with Friends and Family:       Attends Anab[employer]ist Services:       Active Member of Clubs or Organizations:       Attends Club or Organization Meetings:       Marital Status:   Intimate Partner Violence:       Fear of Current or Ex-Partn IGNACIA  4/23/2021 2:39 PM

## 2021-05-07 ENCOUNTER — LAB ENCOUNTER (OUTPATIENT)
Dept: LAB | Facility: HOSPITAL | Age: 81
End: 2021-05-07
Attending: PHYSICAL MEDICINE & REHABILITATION
Payer: MEDICARE

## 2021-05-07 DIAGNOSIS — M48.062 PSEUDOCLAUDICATION SYNDROME: Primary | ICD-10-CM

## 2021-05-07 PROCEDURE — 80053 COMPREHEN METABOLIC PANEL: CPT

## 2021-05-07 PROCEDURE — 36415 COLL VENOUS BLD VENIPUNCTURE: CPT

## 2021-06-09 ENCOUNTER — OFFICE VISIT (OUTPATIENT)
Dept: NEUROLOGY | Facility: CLINIC | Age: 81
End: 2021-06-09
Payer: MEDICARE

## 2021-06-09 VITALS
HEIGHT: 64 IN | BODY MASS INDEX: 31.76 KG/M2 | SYSTOLIC BLOOD PRESSURE: 120 MMHG | WEIGHT: 186 LBS | OXYGEN SATURATION: 97 % | HEART RATE: 64 BPM | DIASTOLIC BLOOD PRESSURE: 78 MMHG

## 2021-06-09 DIAGNOSIS — E66.3 PATIENT OVERWEIGHT: ICD-10-CM

## 2021-06-09 DIAGNOSIS — M54.16 LUMBAR RADICULOPATHY: ICD-10-CM

## 2021-06-09 DIAGNOSIS — M96.1 POSTLAMINECTOMY SYNDROME OF LUMBAR REGION: ICD-10-CM

## 2021-06-09 DIAGNOSIS — R26.9 GAIT DISTURBANCE: Primary | ICD-10-CM

## 2021-06-09 DIAGNOSIS — I48.0 PAROXYSMAL ATRIAL FIBRILLATION (HCC): ICD-10-CM

## 2021-06-09 DIAGNOSIS — N18.4 CKD (CHRONIC KIDNEY DISEASE) STAGE 4, GFR 15-29 ML/MIN (HCC): ICD-10-CM

## 2021-06-09 DIAGNOSIS — R53.1 WEAKNESS: ICD-10-CM

## 2021-06-09 PROBLEM — M54.50 LOW BACK PAIN: Status: ACTIVE | Noted: 2021-05-04

## 2021-06-09 PROBLEM — T81.9XXA COMPLICATION OF SURGICAL PROCEDURE: Status: ACTIVE | Noted: 2020-04-30

## 2021-06-09 PROCEDURE — 99213 OFFICE O/P EST LOW 20 MIN: CPT | Performed by: PHYSICAL MEDICINE & REHABILITATION

## 2021-06-09 NOTE — PROGRESS NOTES
[address]  Progress Note    CHIEF COMPLAINT:  Patient presents with:  Low Back Pain: pt is here for f/u low back pain. LOV 2/13/20  pt states would like you discuss pain management.   states went to PT 6wks cholesterol    • PONV (postoperative nausea and vomiting)    • SEASONAL ALLERGIES    • VARICELLA        SURGICAL HISTORY:  Past Surgical History:   Procedure Laterality Date   • COLONOSCOPY  2010    wnl - next due in 2015   • Kathy Germain Oral Cap Take 5 mg by mouth nightly.          ALLERGIES:     Ciprofloxacin           OTHER (SEE COMMENTS)    Comment:Past allergic reaction ? type             Past allergic reaction ? type  Macrobid [Nitrofura*    WHEEZING  Pravastatin             RASH    C contraindicated due to presence of arrhythmia. 6. CKD (chronic kidney disease) stage 4, GFR 15-29 ml/min (HCC)  NSAIDs contraindicated due to renal disease. Limits treatment options.       7. Patient overweight  Negative comorbidity    No orders of th

## 2021-06-15 ENCOUNTER — PATIENT MESSAGE (OUTPATIENT)
Dept: NEUROLOGY | Facility: CLINIC | Age: 81
End: 2021-06-15

## 2021-06-15 NOTE — TELEPHONE ENCOUNTER
From: Gwen Scanlon  To: Romeo Jenkins MD  Sent: 6/15/2021 3:37 PM CDT  Subject: Other    Dr Gill Smalls at [place] is requesting a referral to [facility] from Dr Satya Snider. The patient name is Luz Marina Sherman.   The fax number for Dr Gill Smalls is

## 2021-06-29 PROBLEM — R26.9 GAIT DISTURBANCE: Status: ACTIVE | Noted: 2021-06-29

## 2021-06-29 PROBLEM — M96.1 POSTLAMINECTOMY SYNDROME OF LUMBAR REGION: Status: ACTIVE | Noted: 2020-04-30

## 2021-06-29 PROBLEM — R53.1 WEAKNESS: Status: ACTIVE | Noted: 2021-06-29

## 2021-07-13 ENCOUNTER — HOSPITAL ENCOUNTER (EMERGENCY)
Facility: HOSPITAL | Age: 81
Discharge: HOME | End: 2021-07-13
Attending: EMERGENCY MEDICINE
Payer: MEDICARE

## 2021-07-13 VITALS
WEIGHT: 189 LBS | OXYGEN SATURATION: 97 % | HEIGHT: 64 IN | HEART RATE: 58 BPM | DIASTOLIC BLOOD PRESSURE: 70 MMHG | BODY MASS INDEX: 32.27 KG/M2 | SYSTOLIC BLOOD PRESSURE: 164 MMHG | RESPIRATION RATE: 18 BRPM | TEMPERATURE: 98 F

## 2021-07-13 DIAGNOSIS — M54.40 BACK PAIN OF LUMBAR REGION WITH SCIATICA: Primary | ICD-10-CM

## 2021-07-13 PROCEDURE — 99283 EMERGENCY DEPT VISIT LOW MDM: CPT

## 2021-07-13 PROCEDURE — 96372 THER/PROPH/DIAG INJ SC/IM: CPT

## 2021-07-13 RX ORDER — METHYLPREDNISOLONE 4 MG/1
TABLET ORAL
Qty: 1 EACH | Refills: 0 | Status: SHIPPED | OUTPATIENT
Start: 2021-07-13 | End: 2021-11-02 | Stop reason: ALTCHOICE

## 2021-07-13 RX ORDER — KETOROLAC TROMETHAMINE 30 MG/ML
30 INJECTION, SOLUTION INTRAMUSCULAR; INTRAVENOUS ONCE
Status: COMPLETED | OUTPATIENT
Start: 2021-07-13 | End: 2021-07-13

## 2021-07-13 NOTE — ED INITIAL ASSESSMENT (HPI)
Pt arrived to ED triage, pt reports lower back pain and left leg sciatic pain, reports has a new mattress and has had right leg pain x5 days, worse tonight. Pt denies urinary ss.

## 2021-07-13 NOTE — ED PROVIDER NOTES
Patient Seen in: [facility] Emergency Department    History   Patient presents with:  Back Pain      HPI    26-year-old female presents the ER with complaint of exacerbation of her low back pain.   Patient states she has a chronic history of left-laila Partners: Male        Comment: post menopause, 11/25/14: rarely,  has health issuse     Other Topics      Concerns:        Caffeine Concern: Yes          2 cups of coffee in the morning        Exercise: Yes      ROS  All pertinent positives for the and neck supple. Comments: Positive straight leg raise test bilaterally,   Skin:     General: Skin is warm and dry. Neurological:      Mental Status: She is alert and oriented to person, place, and time.       Deep Tendon Reflexes: Reflexes are [address] [place] [address] [phone]    Schedule an appointment as soon as possible for a visit  If symptoms worsen      Medications Prescribed:  Current Discharge Medication List    START taking these medications    methylPREDNISolone (MEDROL) 4

## 2021-08-01 ENCOUNTER — HOSPITAL ENCOUNTER (EMERGENCY)
Facility: HOSPITAL | Age: 81
Discharge: HOME | End: 2021-08-01
Attending: EMERGENCY MEDICINE
Payer: MEDICARE

## 2021-08-01 VITALS
TEMPERATURE: 98 F | OXYGEN SATURATION: 97 % | DIASTOLIC BLOOD PRESSURE: 69 MMHG | HEIGHT: 64 IN | BODY MASS INDEX: 30.73 KG/M2 | SYSTOLIC BLOOD PRESSURE: 177 MMHG | WEIGHT: 180 LBS | RESPIRATION RATE: 12 BRPM | HEART RATE: 50 BPM

## 2021-08-01 DIAGNOSIS — M54.16 LUMBAR RADICULOPATHY: ICD-10-CM

## 2021-08-01 DIAGNOSIS — I10 ESSENTIAL HYPERTENSION: Primary | ICD-10-CM

## 2021-08-01 LAB
ANION GAP SERPL CALC-SCNC: 5 MMOL/L (ref 0–18)
BASOPHILS # BLD AUTO: 0.04 X10(3) UL (ref 0–0.2)
BASOPHILS NFR BLD AUTO: 0.4 %
BUN BLD-MCNC: 18 MG/DL (ref 7–18)
BUN/CREAT SERPL: 15.5 (ref 10–20)
CALCIUM BLD-MCNC: 9.8 MG/DL (ref 8.5–10.1)
CHLORIDE SERPL-SCNC: 101 MMOL/L (ref 98–112)
CO2 SERPL-SCNC: 28 MMOL/L (ref 21–32)
CREAT BLD-MCNC: 1.16 MG/DL
DEPRECATED RDW RBC AUTO: 50.3 FL (ref 35.1–46.3)
EOSINOPHIL # BLD AUTO: 0.17 X10(3) UL (ref 0–0.7)
EOSINOPHIL NFR BLD AUTO: 1.7 %
ERYTHROCYTE [DISTWIDTH] IN BLOOD BY AUTOMATED COUNT: 14.2 % (ref 11–15)
GLUCOSE BLD-MCNC: 119 MG/DL (ref 70–99)
HCT VFR BLD AUTO: 37.4 %
HGB BLD-MCNC: 12.2 G/DL
IMM GRANULOCYTES # BLD AUTO: 0.02 X10(3) UL (ref 0–1)
IMM GRANULOCYTES NFR BLD: 0.2 %
LYMPHOCYTES # BLD AUTO: 5.24 X10(3) UL (ref 1–4)
LYMPHOCYTES NFR BLD AUTO: 52.2 %
MCH RBC QN AUTO: 31.3 PG (ref 26–34)
MCHC RBC AUTO-ENTMCNC: 32.6 G/DL (ref 31–37)
MCV RBC AUTO: 95.9 FL
MONOCYTES # BLD AUTO: 0.65 X10(3) UL (ref 0.1–1)
MONOCYTES NFR BLD AUTO: 6.5 %
NEUTROPHILS # BLD AUTO: 3.92 X10 (3) UL (ref 1.5–7.7)
NEUTROPHILS # BLD AUTO: 3.92 X10(3) UL (ref 1.5–7.7)
NEUTROPHILS NFR BLD AUTO: 39 %
OSMOLALITY SERPL CALC.SUM OF ELEC: 281 MOSM/KG (ref 275–295)
PLATELET # BLD AUTO: 188 10(3)UL (ref 150–450)
POTASSIUM SERPL-SCNC: 4.4 MMOL/L (ref 3.5–5.1)
RBC # BLD AUTO: 3.9 X10(6)UL
SODIUM SERPL-SCNC: 134 MMOL/L (ref 136–145)
WBC # BLD AUTO: 10 X10(3) UL (ref 4–11)

## 2021-08-01 PROCEDURE — 36415 COLL VENOUS BLD VENIPUNCTURE: CPT

## 2021-08-01 PROCEDURE — 99283 EMERGENCY DEPT VISIT LOW MDM: CPT

## 2021-08-01 PROCEDURE — 80048 BASIC METABOLIC PNL TOTAL CA: CPT | Performed by: EMERGENCY MEDICINE

## 2021-08-01 PROCEDURE — 96372 THER/PROPH/DIAG INJ SC/IM: CPT

## 2021-08-01 PROCEDURE — 85025 COMPLETE CBC W/AUTO DIFF WBC: CPT | Performed by: EMERGENCY MEDICINE

## 2021-08-01 RX ORDER — LIDOCAINE 50 MG/G
1 PATCH TOPICAL EVERY 24 HOURS
Qty: 7 PATCH | Refills: 0 | Status: SHIPPED | OUTPATIENT
Start: 2021-08-01

## 2021-08-01 RX ORDER — KETOROLAC TROMETHAMINE 30 MG/ML
30 INJECTION, SOLUTION INTRAMUSCULAR; INTRAVENOUS ONCE
Status: COMPLETED | OUTPATIENT
Start: 2021-08-01 | End: 2021-08-01

## 2021-08-01 RX ORDER — HYDROCODONE BITARTRATE AND ACETAMINOPHEN 5; 325 MG/1; MG/1
1 TABLET ORAL EVERY 6 HOURS PRN
Qty: 10 TABLET | Refills: 0 | Status: SHIPPED | OUTPATIENT
Start: 2021-08-01 | End: 2021-08-08

## 2021-08-01 RX ORDER — HYDRALAZINE HYDROCHLORIDE 25 MG/1
25 TABLET, FILM COATED ORAL ONCE
Status: COMPLETED | OUTPATIENT
Start: 2021-08-01 | End: 2021-08-01

## 2021-08-01 NOTE — ED PROVIDER NOTES
Patient Seen in: [facility] Emergency Department      History   Patient presents with:  Hypertension    Stated Complaint: Hypertension    HPI/Subjective:   HPI  [de-identified] yoF with hypertension and chronic low back pain presents for evaluation of hyperten 98 °F (36.7 °C)   Temp src Oral   SpO2 100 %   O2 Device None (Room air)       Current:BP (!) 177/69   Pulse 50   Temp 98 °F (36.7 °C) (Oral)   Resp 12   Ht 162.6 cm (5' 4\")   Wt 81.6 kg   LMP 10/20/1993 (Exact Date)   SpO2 97%   BMI 30.90 kg/m²         P other components within normal limits   CBC W/ DIFFERENTIAL - Abnormal; Notable for the following components:    RDW-SD 50.3 (*)     Lymphocyte Absolute 5.24 (*)     All other components within normal limits   CBC WITH DIFFERENTIAL WITH PLATELET    Narrati screening including reassessment of your blood pressure. Medications Prescribed:  Discharge Medication List as of 8/1/2021  2:43 PM    START taking these medications    lidocaine 5 % External Patch  Place 1 patch onto the skin daily. , Normal, Disp-7 p

## 2021-08-01 NOTE — ED INITIAL ASSESSMENT (HPI)
Pt to ED from [facility] with c/o hypertension. Pt has chart with blood pressure trends noted. Pt c/o intermittent dizziness without headache or visual changes. Pt states she is taking home medications as directed. Pt take baby asa every other day.  Pt denies ches

## 2021-08-17 PROBLEM — M48.062 SPINAL STENOSIS OF LUMBAR REGION WITH NEUROGENIC CLAUDICATION: Status: RESOLVED | Noted: 2020-02-04 | Resolved: 2021-08-17

## 2021-08-17 PROBLEM — I44.0 FIRST DEGREE AV BLOCK: Status: RESOLVED | Noted: 2020-12-07 | Resolved: 2021-08-17

## 2021-08-17 PROBLEM — I48.91 ATRIAL FIBRILLATION (HCC): Status: RESOLVED | Noted: 2019-05-18 | Resolved: 2021-08-17

## 2021-08-17 PROBLEM — M19.072 PRIMARY OSTEOARTHRITIS OF LEFT FOOT: Status: RESOLVED | Noted: 2019-12-10 | Resolved: 2021-08-17

## 2021-08-17 PROBLEM — E66.3 PATIENT OVERWEIGHT: Status: RESOLVED | Noted: 2020-04-30 | Resolved: 2021-08-17

## 2021-08-17 PROBLEM — M54.50 LOW BACK PAIN: Status: RESOLVED | Noted: 2021-05-04 | Resolved: 2021-08-17

## 2021-08-17 PROBLEM — R26.9 GAIT DISTURBANCE: Status: RESOLVED | Noted: 2021-06-29 | Resolved: 2021-08-17

## 2021-08-17 PROBLEM — I47.19 ATRIAL TACHYCARDIA (HCC): Status: ACTIVE | Noted: 2021-08-17

## 2021-08-17 PROBLEM — I67.9 CEREBROVASCULAR DISEASE: Status: RESOLVED | Noted: 2020-08-27 | Resolved: 2021-08-17

## 2021-08-17 PROBLEM — M41.9 SCOLIOSIS: Status: RESOLVED | Noted: 2020-12-07 | Resolved: 2021-08-17

## 2021-08-17 PROBLEM — I12.9 HYPERTENSIVE RENAL DISEASE: Status: RESOLVED | Noted: 2019-05-18 | Resolved: 2021-08-17

## 2021-08-17 PROBLEM — M54.16 LUMBAR RADICULOPATHY: Status: RESOLVED | Noted: 2020-04-30 | Resolved: 2021-08-17

## 2021-08-17 PROBLEM — T81.9XXA COMPLICATION OF SURGICAL PROCEDURE: Status: RESOLVED | Noted: 2020-04-30 | Resolved: 2021-08-17

## 2021-08-17 PROBLEM — R53.1 WEAKNESS: Status: RESOLVED | Noted: 2021-06-29 | Resolved: 2021-08-17

## 2021-08-17 PROBLEM — I47.1 ATRIAL TACHYCARDIA (HCC): Status: ACTIVE | Noted: 2021-08-17

## 2021-08-17 PROBLEM — N18.4 CKD (CHRONIC KIDNEY DISEASE) STAGE 4, GFR 15-29 ML/MIN (HCC): Status: RESOLVED | Noted: 2020-12-11 | Resolved: 2021-08-17

## 2021-08-17 PROBLEM — Z86.2 HISTORY OF LEUKOCYTOSIS: Status: RESOLVED | Noted: 2020-02-01 | Resolved: 2021-08-17

## 2022-05-27 ENCOUNTER — HOSPITAL ENCOUNTER (EMERGENCY)
Facility: HOSPITAL | Age: 82
Discharge: HOME | End: 2022-05-27
Attending: EMERGENCY MEDICINE
Payer: MEDICARE

## 2022-05-27 ENCOUNTER — APPOINTMENT (OUTPATIENT)
Dept: GENERAL RADIOLOGY | Facility: HOSPITAL | Age: 82
End: 2022-05-27
Attending: EMERGENCY MEDICINE
Payer: MEDICARE

## 2022-05-27 VITALS
HEART RATE: 56 BPM | OXYGEN SATURATION: 98 % | TEMPERATURE: 97 F | SYSTOLIC BLOOD PRESSURE: 154 MMHG | WEIGHT: 175 LBS | DIASTOLIC BLOOD PRESSURE: 71 MMHG | BODY MASS INDEX: 29.88 KG/M2 | RESPIRATION RATE: 18 BRPM | HEIGHT: 64 IN

## 2022-05-27 DIAGNOSIS — R07.89 CHEST WALL PAIN: Primary | ICD-10-CM

## 2022-05-27 LAB
ANION GAP SERPL CALC-SCNC: 7 MMOL/L (ref 0–18)
BASOPHILS # BLD AUTO: 0.05 X10(3) UL (ref 0–0.2)
BASOPHILS NFR BLD AUTO: 0.6 %
BUN BLD-MCNC: 31 MG/DL (ref 7–18)
BUN/CREAT SERPL: 24.2 (ref 10–20)
CALCIUM BLD-MCNC: 9.5 MG/DL (ref 8.5–10.1)
CHLORIDE SERPL-SCNC: 106 MMOL/L (ref 98–112)
CO2 SERPL-SCNC: 26 MMOL/L (ref 21–32)
CREAT BLD-MCNC: 1.28 MG/DL
DEPRECATED RDW RBC AUTO: 48.4 FL (ref 35.1–46.3)
EOSINOPHIL # BLD AUTO: 0.22 X10(3) UL (ref 0–0.7)
EOSINOPHIL NFR BLD AUTO: 2.6 %
ERYTHROCYTE [DISTWIDTH] IN BLOOD BY AUTOMATED COUNT: 14.1 % (ref 11–15)
GLUCOSE BLD-MCNC: 89 MG/DL (ref 70–99)
HCT VFR BLD AUTO: 35.4 %
HGB BLD-MCNC: 11.4 G/DL
IMM GRANULOCYTES # BLD AUTO: 0.02 X10(3) UL (ref 0–1)
IMM GRANULOCYTES NFR BLD: 0.2 %
LYMPHOCYTES # BLD AUTO: 4.22 X10(3) UL (ref 1–4)
LYMPHOCYTES NFR BLD AUTO: 50.8 %
MCH RBC QN AUTO: 30.3 PG (ref 26–34)
MCHC RBC AUTO-ENTMCNC: 32.2 G/DL (ref 31–37)
MCV RBC AUTO: 94.1 FL
MONOCYTES # BLD AUTO: 0.68 X10(3) UL (ref 0.1–1)
MONOCYTES NFR BLD AUTO: 8.2 %
NEUTROPHILS # BLD AUTO: 3.12 X10 (3) UL (ref 1.5–7.7)
NEUTROPHILS # BLD AUTO: 3.12 X10(3) UL (ref 1.5–7.7)
NEUTROPHILS NFR BLD AUTO: 37.6 %
OSMOLALITY SERPL CALC.SUM OF ELEC: 294 MOSM/KG (ref 275–295)
PLATELET # BLD AUTO: 176 10(3)UL (ref 150–450)
POTASSIUM SERPL-SCNC: 4.3 MMOL/L (ref 3.5–5.1)
RBC # BLD AUTO: 3.76 X10(6)UL
SODIUM SERPL-SCNC: 139 MMOL/L (ref 136–145)
TROPONIN I HIGH SENSITIVITY: 5 NG/L
WBC # BLD AUTO: 8.3 X10(3) UL (ref 4–11)

## 2022-05-27 PROCEDURE — 84484 ASSAY OF TROPONIN QUANT: CPT | Performed by: EMERGENCY MEDICINE

## 2022-05-27 PROCEDURE — 93005 ELECTROCARDIOGRAM TRACING: CPT

## 2022-05-27 PROCEDURE — 80048 BASIC METABOLIC PNL TOTAL CA: CPT | Performed by: EMERGENCY MEDICINE

## 2022-05-27 PROCEDURE — 99284 EMERGENCY DEPT VISIT MOD MDM: CPT

## 2022-05-27 PROCEDURE — 71045 X-RAY EXAM CHEST 1 VIEW: CPT | Performed by: EMERGENCY MEDICINE

## 2022-05-27 PROCEDURE — 93010 ELECTROCARDIOGRAM REPORT: CPT | Performed by: EMERGENCY MEDICINE

## 2022-05-27 PROCEDURE — 85025 COMPLETE CBC W/AUTO DIFF WBC: CPT | Performed by: EMERGENCY MEDICINE

## 2022-05-27 PROCEDURE — 36415 COLL VENOUS BLD VENIPUNCTURE: CPT

## 2022-05-27 NOTE — ED NOTES
Orders placed in error.        Debbie Parrish MD  Emergency Medicine Physician  [facility]

## 2022-05-27 NOTE — ED INITIAL ASSESSMENT (HPI)
Patient presents to ER with c/o left rib pain that began Monday. Reports that the pain went away initially but has been intermittent since then mostly with the pain at night.

## 2024-06-25 ENCOUNTER — OFFICE VISIT (OUTPATIENT)
Dept: INTERNAL MEDICINE CLINIC | Facility: CLINIC | Age: 84
End: 2024-06-25

## 2024-06-25 VITALS
BODY MASS INDEX: 31.82 KG/M2 | DIASTOLIC BLOOD PRESSURE: 72 MMHG | SYSTOLIC BLOOD PRESSURE: 120 MMHG | WEIGHT: 186.38 LBS | HEIGHT: 64 IN | HEART RATE: 47 BPM

## 2024-06-25 DIAGNOSIS — E55.9 VITAMIN D DEFICIENCY: ICD-10-CM

## 2024-06-25 DIAGNOSIS — R60.0 LOCALIZED EDEMA: ICD-10-CM

## 2024-06-25 DIAGNOSIS — R53.1 WEAKNESS: ICD-10-CM

## 2024-06-25 DIAGNOSIS — Z78.0 ASYMPTOMATIC MENOPAUSAL STATE: ICD-10-CM

## 2024-06-25 DIAGNOSIS — M19.90 ARTHRITIS: ICD-10-CM

## 2024-06-25 DIAGNOSIS — Z00.00 ANNUAL PHYSICAL EXAM: ICD-10-CM

## 2024-06-25 DIAGNOSIS — N18.32 STAGE 3B CHRONIC KIDNEY DISEASE (HCC): ICD-10-CM

## 2024-06-25 DIAGNOSIS — Z12.31 VISIT FOR SCREENING MAMMOGRAM: Primary | ICD-10-CM

## 2024-06-25 DIAGNOSIS — M96.1 POSTLAMINECTOMY SYNDROME OF LUMBAR REGION: ICD-10-CM

## 2024-06-25 PROCEDURE — 90471 IMMUNIZATION ADMIN: CPT | Performed by: NURSE PRACTITIONER

## 2024-06-25 PROCEDURE — 90715 TDAP VACCINE 7 YRS/> IM: CPT | Performed by: NURSE PRACTITIONER

## 2024-06-25 PROCEDURE — 99204 OFFICE O/P NEW MOD 45 MIN: CPT | Performed by: NURSE PRACTITIONER

## 2024-06-25 RX ORDER — FLECAINIDE ACETATE 50 MG/1
50 TABLET ORAL EVERY 12 HOURS
Qty: 180 TABLET | Refills: 3 | Status: SHIPPED | OUTPATIENT
Start: 2024-06-25

## 2024-06-25 RX ORDER — HYDRALAZINE HYDROCHLORIDE 25 MG/1
25 TABLET, FILM COATED ORAL 3 TIMES DAILY
Qty: 270 TABLET | Refills: 1 | Status: SHIPPED | OUTPATIENT
Start: 2024-06-25

## 2024-06-25 RX ORDER — LOSARTAN POTASSIUM 50 MG/1
50 TABLET ORAL DAILY
Qty: 90 TABLET | Refills: 2 | Status: SHIPPED | OUTPATIENT
Start: 2024-06-25

## 2024-06-25 RX ORDER — SPIRONOLACTONE 25 MG/1
TABLET ORAL
Qty: 30 TABLET | Refills: 2 | Status: SHIPPED | OUTPATIENT
Start: 2024-06-25

## 2024-06-25 RX ORDER — METOPROLOL SUCCINATE 100 MG/1
100 TABLET, EXTENDED RELEASE ORAL DAILY
Qty: 90 TABLET | Refills: 3 | Status: SHIPPED | OUTPATIENT
Start: 2024-06-25

## 2024-06-25 RX ORDER — ATORVASTATIN CALCIUM 40 MG/1
40 TABLET, FILM COATED ORAL NIGHTLY
Qty: 90 TABLET | Refills: 2 | Status: SHIPPED | OUTPATIENT
Start: 2024-06-25

## 2024-06-25 RX ORDER — GABAPENTIN 100 MG/1
CAPSULE ORAL
Qty: 120 CAPSULE | Refills: 3 | Status: SHIPPED | OUTPATIENT
Start: 2024-06-25

## 2024-06-25 NOTE — PROGRESS NOTES
HPI:    Patient ID: Stephanie Biswas is a 84 year old female.    HPI Meet and Greet  Mini Physical Exam  84-year-old female who I am meeting for the first time.  She is here with her daughter who is a nurse.  She has a history of an atrial arrhythmia and has been on flecainide for many years.  She has chronic kidney disease and in review of the chart it states at one time she has been in stage IV CKD.    Back Pain  According to family member she had a laminectomy done at [facility] which was considered failed back surgeries because she continued to have back and leg pain.  She also developed gait disturbances.  They said they saw various doctors and then went back to [facility] where a spinal back stimulator was inserted and this has helped control her pain.  She also followed with Dr. Montaño    Nephrologist-Requesting a referral for a neurologist     Spinal back stimulator has helped  Has weakness in her legs post laminectomy (L4, L5)      Vitamin De  Vitamin B 12-    Immunization History   Administered Date(s) Administered    >=3 YRS TRI  MULTIDOSE VIAL (87571) FLU CLINIC 09/14/2009, 11/04/2010, 11/06/2011, 10/29/2012    Covid-19 Vaccine Pfizer 30 mcg/0.3 ml 01/30/2021, 02/20/2021, 10/04/2021, 11/01/2021, 10/14/2022    Covid-19 Vaccine Pfizer Clifford-Sucrose 30 mcg/0.3 ml 05/21/2022    FLU VAC High Dose 65 YRS & Older PRSV Free (26979) 10/04/2023    HEP A 06/09/2014    HIGH DOSE FLU 65 YRS AND OLDER PRSV FREE SINGLE D (06052) FLU CLINIC 11/02/2015, 12/01/2016, 10/08/2018, 11/06/2019    Influenza 11/05/2014, 11/08/2015, 10/01/2020, 10/01/2021, 10/03/2022    Meningococcal (Menomune) 06/09/2014    Pfizer Covid-19 Vaccine 30mcg/0.3ml 12yrs+ (6422-2448) 10/08/2023    Pneumococcal (Prevnar 13) 11/30/2015    Pneumovax 23 02/01/2006    RSV, recombinant, RSVpreF, adjuvanted (Arexvy) 11/27/2023    TDAP 11/04/2010, 06/09/2014, 06/25/2024    TYPHOID 06/09/2014    Zoster Vaccine Live (Zostavax) 12/11/2008, 12/12/2008        Past Medical History:    Anesthesia complication    Arrhythmia    atrial tachycardia    Back problem    High blood pressure    High cholesterol    PONV (postoperative nausea and vomiting)    SEASONAL ALLERGIES    VARICELLA      Past Surgical History:   Procedure Laterality Date    Colonoscopy  2010    wnl - next due in 2015    Spine surgery procedure unlisted  2020    Tonsillectomy      Tubal ligation  1975      Social History     Socioeconomic History    Marital status:     Number of children: 3   Occupational History    Occupation: Retired   Tobacco Use    Smoking status: Former    Smokeless tobacco: Never    Tobacco comments:     quit at age 41--smoked for 20yrs   Vaping Use    Vaping status: Never Used   Substance and Sexual Activity    Alcohol use: Yes     Alcohol/week: 1.0 standard drink of alcohol     Types: 1 Glasses of wine per week     Comment: occ    Drug use: Yes     Types: Cannabis    Sexual activity: Yes     Partners: Male     Comment: post menopause, 11/25/14: rarely,  has health issuse    Other Topics Concern    Caffeine Concern Yes     Comment: 2 cups of coffee in the morning    Exercise Yes          Review of Systems   Constitutional:  Negative for chills, fatigue and fever.   HENT:  Negative for ear pain, hearing loss, sinus pressure, sinus pain, sore throat, trouble swallowing and voice change.    Eyes:  Positive for itching. Negative for pain and visual disturbance.   Respiratory:  Negative for cough, chest tightness and shortness of breath.    Cardiovascular:  Positive for leg swelling. Negative for chest pain and palpitations.   Gastrointestinal:  Negative for abdominal pain, constipation, diarrhea, nausea and vomiting.   Endocrine: Positive for cold intolerance (Legs). Negative for heat intolerance.   Genitourinary:  Negative for dysuria, hematuria and vaginal discharge.   Musculoskeletal:  Positive for back pain (Lower back pain). Negative for joint swelling and neck  pain.        Bilateral knee pain   Skin:  Negative for rash.   Allergic/Immunologic: Positive for environmental allergies (Mold).   Neurological:  Positive for weakness (Weakness in legs). Negative for numbness and headaches.   Hematological:  Does not bruise/bleed easily.   Psychiatric/Behavioral:  Negative for dysphoric mood and sleep disturbance. The patient is not nervous/anxious.               Current Outpatient Medications   Medication Sig Dispense Refill    spironolactone 25 MG Oral Tab TAKE 1 TABLET BY MOUTH DAILY AS NEEDED FOR SWELLING 30 tablet 0    metoprolol succinate 100 MG Oral Tablet 24 Hr Take 1 tablet (100 mg total) by mouth daily. 90 tablet 3    gabapentin 300 MG Oral Cap Take 1 capsule (300 mg total) by mouth 2 (two) times daily. (Patient taking differently: Take 100 mg by mouth 2 (two) times daily.) 180 capsule 1    HYDRALAZINE 25 MG Oral Tab TAKE 1 TABLET BY MOUTH THREE TIMES DAILY 270 tablet 1    losartan 50 MG Oral Tab Take 1 tablet (50 mg total) by mouth daily. 90 tablet 1    FLECAINIDE 50 MG Oral Tab TAKE 1 TABLET(50 MG) BY MOUTH EVERY 12 HOURS 180 tablet 3    Diclofenac Sodium 1 % External Gel Apply 1 g topically 3 (three) times daily.      acetaminophen 500 MG Oral Tab Take 1 tablet (500 mg total) by mouth every 4 (four) hours as needed for Pain.      atorvastatin (LIPITOR) 40 MG Oral Tab Take 40 mg by mouth. (Patient taking differently: nightly. Take 40 mg by mouth.) 90 tablet 3    Cranberry 1000 MG Oral Cap Take by mouth.        melatonin 5 MG Oral Cap Take 1 capsule (5 mg total) by mouth nightly.       Allergies:  Allergies   Allergen Reactions    Ciprofloxacin OTHER (SEE COMMENTS)     Past allergic reaction ? type  Past allergic reaction ? type      Macrobid [Nitrofurantoin] WHEEZING    Pravastatin RASH     rash    Sulfa Antibiotics OTHER (SEE COMMENTS)     Bone aching  Shortness of breath    Pravachol [Pravastatin Sodium] HIVES    Cephalosporins UNKNOWN and OTHER (SEE COMMENTS)     Fentanyl OTHER (SEE COMMENTS)     Stiff leg syndrome     Keflex [Cephalexin] OTHER (SEE COMMENTS)     Bilateral leg swelling    Norvasc [Amlodipine] OTHER (SEE COMMENTS)     Chest tightness, dizziness and malaise.    Sulfamethoxazole W/Trimethoprim UNKNOWN    Tramadol OTHER (SEE COMMENTS)     Muscle pain     Trichophyton OTHER (SEE COMMENTS)    Lisinopril RASH    Simvastatin RASH      PHYSICAL EXAM:   Physical Exam  /72 (BP Location: Right arm, Patient Position: Sitting, Cuff Size: adult)   Pulse (!) 47   Ht 5' 4\" (1.626 m)   Wt 186 lb 6.4 oz (84.6 kg)   LMP 10/20/1993 (Exact Date)   BMI 32.00 kg/m²   Wt Readings from Last 2 Encounters:   06/25/24 186 lb 6.4 oz (84.6 kg)   05/27/22 175 lb (79.4 kg)     Body mass index is 32 kg/m².(2)  Lab Results   Component Value Date    WBC 8.3 05/27/2022    RBC 3.76 (L) 05/27/2022    HGB 11.4 (L) 05/27/2022    HCT 35.4 05/27/2022    MCV 94.1 05/27/2022    MCH 30.3 05/27/2022    MCHC 32.2 05/27/2022    RDW 14.1 05/27/2022    .0 05/27/2022      Lab Results   Component Value Date    GLU 89 05/27/2022    BUN 31 (H) 05/27/2022    BUNCREA 24.2 (H) 05/27/2022    CREATSERUM 1.28 (H) 05/27/2022    ANIONGAP 7 05/27/2022    GFRNAA 39 (L) 05/27/2022    GFRAA 45 (L) 05/27/2022    CA 9.5 05/27/2022    OSMOCALC 294 05/27/2022    ALKPHO 83 05/07/2021    AST 24 05/07/2021    ALT 35 05/07/2021    BILT 0.4 05/07/2021    TP 7.0 05/07/2021    ALB 4.4 07/29/2021    GLOBULIN 3.1 05/07/2021     05/27/2022    K 4.3 05/27/2022     05/27/2022    CO2 26.0 05/27/2022      No results found for: \"EAG\", \"A1C\"   Lab Results   Component Value Date    CHOLEST 169.00 09/23/2020    TRIG 71.00 09/23/2020    HDL 77 09/23/2020    LDL 78 09/23/2020    VLDL 14 09/23/2020      Lab Results   Component Value Date    TSH 1.850 09/23/2020                ASSESSMENT/PLAN:     Problem List Items Addressed This Visit       Stage 3b chronic kidney disease (HCC)    Relevant Orders    Nephrology  Referral - In Network    Comp Metabolic Panel (14)    Postlaminectomy syndrome of lumbar region     Would like to try some physical therapy for strengthining          Other Visit Diagnoses       Visit for screening mammogram    -  Primary    Relevant Orders    [facility] DENNIS 2D+3D SCREENING BILAT (CPT=77067/33004)    Asymptomatic menopausal state        Relevant Orders    Dexa Bone Density (CPT=77080)    Arthritis        Relevant Orders    RHEUMATOLOGY - INTERNAL    Weakness        Relevant Orders    Physical Therapy Referral - [facility]    Annual physical exam        Relevant Orders    Comp Metabolic Panel (14)    Lipid Panel    Vitamin D, 25-Hydroxy    Vitamin B12    TSH W Reflex To Free T4    CBC, NO DIFFERENTIAL/PLATELET    Vitamin D deficiency        Relevant Orders    Vitamin D, 25-Hydroxy               Orders Placed This Encounter   Procedures    Comp Metabolic Panel (14)    Lipid Panel    Vitamin D, 25-Hydroxy    Vitamin B12    TSH W Reflex To Free T4    CBC, NO DIFFERENTIAL/PLATELET    TETANUS, DIPHTHERIA TOXOIDS AND ACELLULAR PERTUSIS VACCINE (TDAP), >7 YEARS, IM USE       Meds This Visit:  Requested Prescriptions      No prescriptions requested or ordered in this encounter       Imaging & Referrals:  PHYSICAL THERAPY - INTERNAL  RHEUMATOLOGY - INTERNAL  NEPHROLOGY - INTERNAL  TETANUS, DIPHTHERIA TOXOIDS AND ACELLULAR PERTUSIS VACCINE (TDAP), >7 YEARS, IM USE  [facility] DENNIS 2D+3D SCREENING BILAT (CPT=77067/05465)  XR DEXA BONE DENSITOMETRY (QSH=90660)         CARLOS A Parkinson

## 2024-06-28 ENCOUNTER — LAB ENCOUNTER (OUTPATIENT)
Dept: LAB | Facility: HOSPITAL | Age: 84
End: 2024-06-28
Attending: NURSE PRACTITIONER
Payer: MEDICARE

## 2024-06-28 DIAGNOSIS — E55.9 VITAMIN D DEFICIENCY: ICD-10-CM

## 2024-06-28 DIAGNOSIS — N18.32 STAGE 3B CHRONIC KIDNEY DISEASE (HCC): ICD-10-CM

## 2024-06-28 DIAGNOSIS — Z00.00 ANNUAL PHYSICAL EXAM: ICD-10-CM

## 2024-06-28 LAB
ALBUMIN SERPL-MCNC: 4.4 G/DL (ref 3.2–4.8)
ALBUMIN/GLOB SERPL: 1.8 {RATIO} (ref 1–2)
ALP LIVER SERPL-CCNC: 73 U/L
ALT SERPL-CCNC: 20 U/L
ANION GAP SERPL CALC-SCNC: 0 MMOL/L (ref 0–18)
AST SERPL-CCNC: 26 U/L (ref ?–34)
BILIRUB SERPL-MCNC: 0.8 MG/DL (ref 0.2–1.1)
BUN BLD-MCNC: 16 MG/DL (ref 9–23)
BUN/CREAT SERPL: 10.7 (ref 10–20)
CALCIUM BLD-MCNC: 9.8 MG/DL (ref 8.7–10.4)
CHLORIDE SERPL-SCNC: 111 MMOL/L (ref 98–112)
CHOLEST SERPL-MCNC: 153 MG/DL (ref ?–200)
CO2 SERPL-SCNC: 29 MMOL/L (ref 21–32)
CREAT BLD-MCNC: 1.49 MG/DL
DEPRECATED RDW RBC AUTO: 49.4 FL (ref 35.1–46.3)
EGFRCR SERPLBLD CKD-EPI 2021: 35 ML/MIN/1.73M2 (ref 60–?)
ERYTHROCYTE [DISTWIDTH] IN BLOOD BY AUTOMATED COUNT: 14.5 % (ref 11–15)
FASTING PATIENT LIPID ANSWER: NO
FASTING STATUS PATIENT QL REPORTED: NO
GLOBULIN PLAS-MCNC: 2.4 G/DL (ref 2–3.5)
GLUCOSE BLD-MCNC: 94 MG/DL (ref 70–99)
HCT VFR BLD AUTO: 36.3 %
HDLC SERPL-MCNC: 64 MG/DL (ref 40–59)
HGB BLD-MCNC: 11.9 G/DL
LDLC SERPL CALC-MCNC: 75 MG/DL (ref ?–100)
MCH RBC QN AUTO: 30.7 PG (ref 26–34)
MCHC RBC AUTO-ENTMCNC: 32.8 G/DL (ref 31–37)
MCV RBC AUTO: 93.6 FL
NONHDLC SERPL-MCNC: 89 MG/DL (ref ?–130)
OSMOLALITY SERPL CALC.SUM OF ELEC: 291 MOSM/KG (ref 275–295)
PLATELET # BLD AUTO: 160 10(3)UL (ref 150–450)
POTASSIUM SERPL-SCNC: 4.1 MMOL/L (ref 3.5–5.1)
PROT SERPL-MCNC: 6.8 G/DL (ref 5.7–8.2)
RBC # BLD AUTO: 3.88 X10(6)UL
SODIUM SERPL-SCNC: 140 MMOL/L (ref 136–145)
TRIGL SERPL-MCNC: 72 MG/DL (ref 30–149)
TSI SER-ACNC: 2.81 MIU/ML (ref 0.55–4.78)
VIT B12 SERPL-MCNC: 1549 PG/ML (ref 211–911)
VIT D+METAB SERPL-MCNC: 72 NG/ML (ref 30–100)
VLDLC SERPL CALC-MCNC: 11 MG/DL (ref 0–30)
WBC # BLD AUTO: 10 X10(3) UL (ref 4–11)

## 2024-06-28 PROCEDURE — 80061 LIPID PANEL: CPT

## 2024-06-28 PROCEDURE — 85027 COMPLETE CBC AUTOMATED: CPT

## 2024-06-28 PROCEDURE — 36415 COLL VENOUS BLD VENIPUNCTURE: CPT

## 2024-06-28 PROCEDURE — 80053 COMPREHEN METABOLIC PANEL: CPT

## 2024-06-28 PROCEDURE — 84443 ASSAY THYROID STIM HORMONE: CPT

## 2024-06-28 PROCEDURE — 82306 VITAMIN D 25 HYDROXY: CPT

## 2024-06-28 PROCEDURE — 82607 VITAMIN B-12: CPT

## 2024-06-29 DIAGNOSIS — N18.32 STAGE 3B CHRONIC KIDNEY DISEASE (HCC): Primary | ICD-10-CM

## 2024-07-03 ENCOUNTER — LAB ENCOUNTER (OUTPATIENT)
Dept: LAB | Facility: HOSPITAL | Age: 84
End: 2024-07-03
Attending: INTERNAL MEDICINE
Payer: MEDICARE

## 2024-07-03 ENCOUNTER — HOSPITAL ENCOUNTER (OUTPATIENT)
Dept: GENERAL RADIOLOGY | Facility: HOSPITAL | Age: 84
Discharge: HOME | End: 2024-07-03
Attending: INTERNAL MEDICINE
Payer: MEDICARE

## 2024-07-03 ENCOUNTER — OFFICE VISIT (OUTPATIENT)
Dept: RHEUMATOLOGY | Facility: CLINIC | Age: 84
End: 2024-07-03
Payer: MEDICARE

## 2024-07-03 VITALS
BODY MASS INDEX: 31.76 KG/M2 | HEIGHT: 64 IN | DIASTOLIC BLOOD PRESSURE: 70 MMHG | WEIGHT: 186 LBS | SYSTOLIC BLOOD PRESSURE: 130 MMHG | HEART RATE: 48 BPM

## 2024-07-03 DIAGNOSIS — M79.642 BILATERAL HAND PAIN: ICD-10-CM

## 2024-07-03 DIAGNOSIS — M79.641 BILATERAL HAND PAIN: ICD-10-CM

## 2024-07-03 DIAGNOSIS — M25.50 POLYARTHRALGIA: ICD-10-CM

## 2024-07-03 DIAGNOSIS — M25.50 POLYARTHRALGIA: Primary | ICD-10-CM

## 2024-07-03 LAB
CRP SERPL-MCNC: <0.4 MG/DL (ref ?–1)
ERYTHROCYTE [SEDIMENTATION RATE] IN BLOOD: 8 MM/HR
RHEUMATOID FACT SERPL-ACNC: 10.2 IU/ML (ref ?–14)

## 2024-07-03 PROCEDURE — 86431 RHEUMATOID FACTOR QUANT: CPT | Performed by: INTERNAL MEDICINE

## 2024-07-03 PROCEDURE — 73560 X-RAY EXAM OF KNEE 1 OR 2: CPT | Performed by: INTERNAL MEDICINE

## 2024-07-03 PROCEDURE — 73522 X-RAY EXAM HIPS BI 3-4 VIEWS: CPT | Performed by: INTERNAL MEDICINE

## 2024-07-03 PROCEDURE — 85652 RBC SED RATE AUTOMATED: CPT | Performed by: INTERNAL MEDICINE

## 2024-07-03 PROCEDURE — 73130 X-RAY EXAM OF HAND: CPT | Performed by: INTERNAL MEDICINE

## 2024-07-03 PROCEDURE — 86140 C-REACTIVE PROTEIN: CPT | Performed by: INTERNAL MEDICINE

## 2024-07-03 PROCEDURE — 36415 COLL VENOUS BLD VENIPUNCTURE: CPT | Performed by: INTERNAL MEDICINE

## 2024-07-03 PROCEDURE — 99204 OFFICE O/P NEW MOD 45 MIN: CPT | Performed by: INTERNAL MEDICINE

## 2024-07-03 PROCEDURE — 86200 CCP ANTIBODY: CPT | Performed by: INTERNAL MEDICINE

## 2024-07-03 NOTE — PROGRESS NOTES
Stephanie Biswas is a 84 year old female who presents for   Chief Complaint   Patient presents with    Consult     NP referred by Susy STRAUSS for Arthritis     Joint Pain    Hand Pain     Oracio    .   HPI:   CC: joint pain, hand pain  Consult: referred by NP Susy Quick    This is a 83 yo F with hx of HTN, HLD, Tachycardia, CKD stage 3, Lumbar laminectomy 2020 referred for polyarthralgia's.  She reports joint pain involving her hands for the past 3 to 4 years.  She noticed that some of her fingers have changed.  She has some discomfort in her right third and fourth PIP.  No swelling.  She is able to make a fist.  She also has pain in her hips, knees and ankles.  She is not sure if it is stemming from her back as she has chronic lower back pain.  She has had a laminectomy in 2020.  She also has electric stimulators in her lower spine for sciatica.  Continues to have restless leg and neuropathy in her legs.  She also walks with a cane for balance and lower back pain.  Denies any history of psoriasis.  She does have chronic kidney disease so cannot take NSAIDs.  She takes Tylenol as needed and uses Voltaren gel.    Current medications:  Gabapentin 200 mg at night- for RLS and neuropathy in legs     Wt Readings from Last 2 Encounters:   07/03/24 186 lb (84.4 kg)   06/25/24 186 lb 6.4 oz (84.6 kg)     Body mass index is 31.93 kg/m².      Current Outpatient Medications   Medication Sig Dispense Refill    spironolactone 25 MG Oral Tab TAKE 1 TABLET BY MOUTH DAILY AS NEEDED FOR SWELLING 30 tablet 2    metoprolol succinate  MG Oral Tablet 24 Hr Take 1 tablet (100 mg total) by mouth daily. 90 tablet 3    melatonin 5 MG Oral Cap Take 1 capsule (5 mg total) by mouth nightly. 90 capsule 2    losartan 50 MG Oral Tab Take 1 tablet (50 mg total) by mouth daily. 90 tablet 2    hydrALAZINE 25 MG Oral Tab Take 1 tablet (25 mg total) by mouth 3 (three) times daily. 270 tablet 1    gabapentin 100 MG Oral Cap Take 200 mg at  night 120 capsule 3    flecainide 50 MG Oral Tab Take 1 tablet (50 mg total) by mouth Q12H. 180 tablet 3    diclofenac 1 % External Gel Apply 1 g topically 3 (three) times daily. 1 each 3    Cranberry 1000 MG Oral Cap Take 1,000 mg by mouth daily. 90 capsule 0    atorvastatin (LIPITOR) 40 MG Oral Tab Take 1 tablet (40 mg total) by mouth nightly. Take 40 mg by mouth. 90 tablet 2    acetaminophen 500 MG Oral Tab Take 1 tablet (500 mg total) by mouth every 4 (four) hours as needed for Pain.      gabapentin 300 MG Oral Cap Take 1 capsule (300 mg total) by mouth 2 (two) times daily. (Patient taking differently: Take 100 mg by mouth 2 (two) times daily.) 180 capsule 1      Past Medical History:    Anesthesia complication    Arrhythmia    atrial tachycardia    Back problem    High blood pressure    High cholesterol    PONV (postoperative nausea and vomiting)    SEASONAL ALLERGIES    VARICELLA      Past Surgical History:   Procedure Laterality Date    Colonoscopy  2010    wnl - next due in 2015    Spine surgery procedure unlisted  2020    Tonsillectomy      Tubal ligation  1975      Family History   Problem Relation Age of Onset    Other (Other) Father         brain tumor    Stroke Mother     Hypertension Sister       Social History:  Social History     Socioeconomic History    Marital status:     Number of children: 3   Occupational History    Occupation: Retired   Tobacco Use    Smoking status: Former    Smokeless tobacco: Never    Tobacco comments:     quit at age 41--smoked for 20yrs   Vaping Use    Vaping status: Never Used   Substance and Sexual Activity    Alcohol use: Yes     Alcohol/week: 1.0 standard drink of alcohol     Types: 1 Glasses of wine per week     Comment: occ    Drug use: Yes     Types: Cannabis    Sexual activity: Yes     Partners: Male     Comment: post menopause, 11/25/14: rarely,  has health issuse    Other Topics Concern    Caffeine Concern Yes     Comment: 2 cups of coffee in the  morning    Exercise Yes   Social History Narrative    The patient uses the following assistive device(s):  single-point cane.      The patient does live in a home with stairs.     Social Determinants of Health     Food Insecurity: Low Risk  (6/18/2021)    Received from [facility]    Food Insecurity     Have there been times that your food ran out, and you didn't have money to get more?: No     Are there times that you worry that this might happen?: No   Transportation Needs: Low Risk  (6/18/2021)    Received from [facility]    Transportation Needs     Do you have trouble getting transportation to medical appointments?: No     How do you normally get to and from your appointments?: Other   Social Connections: Low Risk  (6/18/2021)    Received from [facility]    Social Connections     Do you have someone you could call for help if needed?: Yes    Received from [facility]           REVIEW OF SYSTEMS:   Review Of Systems:  Constitutional: No fever, no change in weight or appetitie  Derm: No rashes, no oral ulcers, no alopecia, no photosensitivity, no psoriasis  HEENT: No dry eyes, no dry mouth, no Raynaud's, no nasal ulcers, no parotid swelling, no neck pain, no jaw pain, no temple pain  Eyes: No visual changes,   CVS: No chest pain, no heart disease  RS: No SOB, no Cough, No Pleurtic pain,   GI: No nausea, no vomiiting, no abominal pain, no hx of ulcer, no gastritis, no heartburn, no dyshpagia, no BRBPR or melena  : no dysuria, no hx of miscarriages, no DVT Hx, no hx of OCP,   Neuro: + numbness or tingling, no headache, no hx of seizures,   Psych: no hx of anxiety or depression  ENDO: no hx of thyroid disease, no hx of DM  Joint/Muscluskeltal: see HPI,   All other ROS are negative.     EXAM:   /70 (BP Location: Right arm, Patient Position: Sitting, Cuff Size: adult)   Pulse (!) 48   Ht 5' 4\" (1.626 m)   Wt 186 lb (84.4 kg)    LMP 10/20/1993 (Exact Date)   BMI 31.93 kg/m²   GEN: AAOx3, NAD  HEENT: EOMI, PERRLA, no injection or icterus, oral mucosa moist, no oral lesions. No lymphadenopathy. No facial rash  CVS: RRR, no murmurs rubs or gallops. Equal 2+ distal pulses.   LUNGS: CTAB, no increased work of breathing  ABDOMEN:  soft NT/ND, +BS, no HSM  SKIN: No rashes or skin lesions. No nail findings  MSK:  Right second and third PIPs with Heberden nodes.  Able to make a fist.  No swelling or synovitis in the hands  Full range of motion of the shoulders  NEURO: Cranial nerves II-XII intact grossly. 5/5 strength throughout in both upper and lower extremities, sensation intact.  PSYCH: normal mood    LABS:     Reviewed     IMAGING:     MRI lumbar spine 2021:  CONCLUSION:   1. Postoperative changes of decompressive laminectomies are seen at L3 and L4.      2. Moderate spinal canal stenosis at L2-L3 with slight right greater left foraminal narrowing.      3. There is suggestion of a caudally directed sequestered disc fragment at L1-L2.      4. Mild levoscoliosis with degenerative changes of the lumbar endplates.      5. No acute osseous injury of the lumbar spine is apparent.     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  She has evidence of Tanja's nodes  - Plan to obtain x-rays of the hands, knees and hips  - Plan to also obtain further blood work  - For now she will continue Tylenol as needed, Voltaren gel  - She cannot take NSAIDs due to CKD  - She will also try purchasing copper gloves to help with pain in her hands    CKD stage III  - She will avoid NSAIDs    Chronic lower back pain s/p laminectomy  - She also has a spinal stimulator placed.  Continues to have lower back pain    Thank you for allowing me to participate in this patients care. Pt will be notified of results and if follow-up is needed    Sheryl Garcia MD  7/3/2024  9:10 AM

## 2024-07-03 NOTE — PATIENT INSTRUCTIONS
You were seen today for joint pain in your hands, hips, knees  Lets get some more blood work and x-rays for further evaluation  Continue Tylenol as needed  Also use Voltaren gel  Try purchasing copper gloves to help with pain in your hands

## 2024-07-05 LAB — CCP IGG SERPL-ACNC: 1.7 U/ML (ref 0–6.9)

## 2024-07-31 ENCOUNTER — HOSPITAL ENCOUNTER (OUTPATIENT)
Dept: MAMMOGRAPHY | Age: 84
Discharge: HOME | End: 2024-07-31
Attending: NURSE PRACTITIONER
Payer: MEDICARE

## 2024-07-31 ENCOUNTER — HOSPITAL ENCOUNTER (OUTPATIENT)
Dept: BONE DENSITY | Age: 84
Discharge: HOME | End: 2024-07-31
Attending: NURSE PRACTITIONER
Payer: MEDICARE

## 2024-07-31 DIAGNOSIS — Z78.0 ASYMPTOMATIC MENOPAUSAL STATE: ICD-10-CM

## 2024-07-31 DIAGNOSIS — Z12.31 VISIT FOR SCREENING MAMMOGRAM: ICD-10-CM

## 2024-07-31 PROCEDURE — 77080 DXA BONE DENSITY AXIAL: CPT | Performed by: NURSE PRACTITIONER

## 2024-07-31 PROCEDURE — 77063 BREAST TOMOSYNTHESIS BI: CPT | Performed by: NURSE PRACTITIONER

## 2024-07-31 PROCEDURE — 77067 SCR MAMMO BI INCL CAD: CPT | Performed by: NURSE PRACTITIONER

## 2024-08-14 ENCOUNTER — MED REC SCAN ONLY (OUTPATIENT)
Dept: INTERNAL MEDICINE CLINIC | Facility: CLINIC | Age: 84
End: 2024-08-14

## 2024-11-12 ENCOUNTER — OFFICE VISIT (OUTPATIENT)
Dept: NEPHROLOGY | Facility: CLINIC | Age: 84
End: 2024-11-12
Payer: MEDICARE

## 2024-11-12 VITALS
DIASTOLIC BLOOD PRESSURE: 60 MMHG | HEART RATE: 59 BPM | WEIGHT: 183 LBS | SYSTOLIC BLOOD PRESSURE: 118 MMHG | BODY MASS INDEX: 31.24 KG/M2 | HEIGHT: 64 IN

## 2024-11-12 DIAGNOSIS — N18.30 STAGE 3 CHRONIC KIDNEY DISEASE, UNSPECIFIED WHETHER STAGE 3A OR 3B CKD (HCC): Primary | ICD-10-CM

## 2024-11-12 DIAGNOSIS — I10 PRIMARY HYPERTENSION: ICD-10-CM

## 2024-11-12 PROCEDURE — 99205 OFFICE O/P NEW HI 60 MIN: CPT | Performed by: INTERNAL MEDICINE

## 2024-11-12 RX ORDER — MELATONIN
1000 2 TIMES DAILY
COMMUNITY

## 2024-11-12 NOTE — PATIENT INSTRUCTIONS
Follow up in 8 -10 weeks   Lab tests with other tests   [facility] - call to make an appointment

## 2024-11-12 NOTE — PROGRESS NOTES
Consult Requested By: Dr. Dexter    Reason for Consult: CKD stage III     HPI:     Patient is a 84 yrs old female with pmh of HTN, HL, CKD stage III, lymphedema who presented for work up and evaluation of kidney disease     Lab tests showed BUN/Cr 16/1.49 mg/dl with an eGFR 35 ml/min. Creatinine mostly in 1.2 mg/dl since 2021. Serum albumin and calcium wnl. Hgb 11.9 g/dl    Non smoker and no alcohol.     On hydralazine, losartan , metoprolol - BP in office stable. Doesn't monitor BP at home    Mild leg swelling with salty food and prolong standing. Urinary continent - chronic.     Drinks 4 mugs of green and one glass of water       HISTORY:  Past Medical History:    Anesthesia complication    Arrhythmia    atrial tachycardia    Back problem    High blood pressure    High cholesterol    PONV (postoperative nausea and vomiting)    SEASONAL ALLERGIES    VARICELLA      Past Surgical History:   Procedure Laterality Date    Colonoscopy  2010    wnl - next due in 2015    Electric stimulation therapy Bilateral 2024    pain management    Spine surgery procedure unlisted  2020    Tonsillectomy      Tubal ligation  1975      Family History   Problem Relation Age of Onset    Stroke Mother     Other (Other) Father         brain tumor    Hypertension Sister     Breast Cancer Paternal Aunt 94      Social History:   Social History     Socioeconomic History    Marital status:     Number of children: 3   Occupational History    Occupation: Retired   Tobacco Use    Smoking status: Former    Smokeless tobacco: Never    Tobacco comments:     quit at age 41--smoked for 20yrs   Vaping Use    Vaping status: Never Used   Substance and Sexual Activity    Alcohol use: Yes     Alcohol/week: 1.0 standard drink of alcohol     Types: 1 Glasses of wine per week     Comment: occ    Drug use: Yes     Types: Cannabis    Sexual activity: Yes     Partners: Male     Comment: post menopause, 11/25/14: rarely,  has health issuse     Other Topics Concern    Caffeine Concern Yes     Comment: 2 cups of coffee in the morning    Exercise Yes   Social History Narrative    The patient uses the following assistive device(s):  single-point cane.      The patient does live in a home with stairs.     Social Drivers of Health     Food Insecurity: Low Risk  (6/18/2021)    Received from [facility]    Food Insecurity     Have there been times that your food ran out, and you didn't have money to get more?: No     Are there times that you worry that this might happen?: No   Transportation Needs: Low Risk  (6/18/2021)    Received from [facility]    Transportation Needs     Do you have trouble getting transportation to medical appointments?: No     How do you normally get to and from your appointments?: Other   Social Connections: Low Risk  (6/18/2021)    Received from [facility]    Social Connections     Do you have someone you could call for help if needed?: Yes    Received from [facility]        Medications (Active prior to today's visit):  Current Outpatient Medications   Medication Sig Dispense Refill    Cholecalciferol 125 MCG (5000 UT) Oral Tab Take 1 tablet (5,000 Units total) by mouth daily.      cyanocobalamin 1000 MCG Oral Tab Take 1 tablet (1,000 mcg total) by mouth in the morning and 1 tablet (1,000 mcg total) before bedtime.      Calcium Citrate-Vitamin D (CALCIUM CITRATE PETITE/VIT D OR) Take 400 mg by mouth in the morning and 400 mg before bedtime.      spironolactone 25 MG Oral Tab TAKE 1 TABLET BY MOUTH DAILY AS NEEDED FOR SWELLING 30 tablet 2    metoprolol succinate  MG Oral Tablet 24 Hr Take 1 tablet (100 mg total) by mouth daily. 90 tablet 3    melatonin 5 MG Oral Cap Take 1 capsule (5 mg total) by mouth nightly. 90 capsule 2    losartan 50 MG Oral Tab Take 1 tablet (50 mg total) by mouth daily. 90 tablet 2    hydrALAZINE 25 MG Oral Tab Take 1 tablet  (25 mg total) by mouth 3 (three) times daily. 270 tablet 1    gabapentin 100 MG Oral Cap Take 200 mg at night 120 capsule 3    flecainide 50 MG Oral Tab Take 1 tablet (50 mg total) by mouth Q12H. (Patient taking differently: Take 1 tablet (50 mg total) by mouth daily.) 180 tablet 3    diclofenac 1 % External Gel Apply 1 g topically 3 (three) times daily. 1 each 3    Cranberry 1000 MG Oral Cap Take 1,000 mg by mouth daily. 90 capsule 0    atorvastatin (LIPITOR) 40 MG Oral Tab Take 1 tablet (40 mg total) by mouth nightly. Take 40 mg by mouth. 90 tablet 2    acetaminophen 500 MG Oral Tab Take 1 tablet (500 mg total) by mouth every 4 (four) hours as needed for Pain.         Allergies:  Allergies[1]      ROS:     Constitutional:  Negative for decreased activity, fever, irritability and lethargy  ENMT:  Negative for ear drainage, hearing loss and nasal drainage  Eyes:  Negative for eye discharge and vision loss  Cardiovascular:  Negative for chest pain, sobs  Respiratory:  Negative for cough, dyspnea and wheezing  Gastrointestinal:  Negative for abdominal pain, constipation  Genitourinary:  Negative for dysuria and hematuria  Endocrine:  Negative for abnormal sleep patterns, increased activity  Hema/Lymph:  Negative for easy bleeding and easy bruising  Integumentary:  Negative for pruritus and rash  Musculoskeletal:  Negative for bone/joint symptoms  Neurological:  Negative for gait disturbance      Vitals:    11/12/24 1320   BP: 118/60   Pulse: 59     Wt Readings from Last 6 Encounters:   11/12/24 183 lb (83 kg)   07/03/24 186 lb (84.4 kg)   06/25/24 186 lb 6.4 oz (84.6 kg)   05/27/22 175 lb (79.4 kg)   12/06/21 181 lb (82.1 kg)   11/02/21 180 lb (81.6 kg)       PHYSICAL EXAM:     Constitutional: appears well hydrated alert and responsive no acute distress noted  Head/Face: normocephalic  Eyes/Vision: normal extraocular motion is intact  Nose/Mouth/Throat:mucous membranes are moist   Neck/Thyroid: neck is supple    Skin/Hair: no unusual rashes present  Back/Spine: no abnormalities noted  Musculoskeletal:  no deformities  Extremities: no edema  Neurological:  Grossly normal       ASSESSMENT/PLAN:     CKD stage III:  Creatinine mostly in 1.2 mg/dl since 2021.   BUN/Cr 16/1.49 mg/dl with an eGFR 35 ml/min.   Increase water intake and lower the green tea intake  Repeat lab test and check UA   Serum albumin and calcium wnl. Hgb 11.9 g/dl  Check renal US   Avoid nephrotoxins    2.HTN:  On hydralazine, losartan , metoprolol - BP in office stable. Doesn't monitor BP at home    Follow up in 8 -10 weeks   Lab tests with other tests      Orders This Visit:  Orders Placed This Encounter   Procedures    Urinalysis, Routine    Renal Function Panel       Meds This Visit:  Requested Prescriptions      No prescriptions requested or ordered in this encounter       Imaging & Referrals:  [facility] ([id number])     11/12/2024  Melvin Bobo MD      No follow-ups on file.          [1]   Allergies  Allergen Reactions    Ciprofloxacin OTHER (SEE COMMENTS)     Past allergic reaction ? type  Past allergic reaction ? type      Macrobid [Nitrofurantoin] WHEEZING    Pravastatin RASH     rash    Sulfa Antibiotics OTHER (SEE COMMENTS)     Bone aching  Shortness of breath    Pravachol [Pravastatin Sodium] HIVES    Cephalosporins UNKNOWN and OTHER (SEE COMMENTS)    Fentanyl OTHER (SEE COMMENTS)     Stiff leg syndrome     Keflex [Cephalexin] OTHER (SEE COMMENTS)     Bilateral leg swelling    Norvasc [Amlodipine] OTHER (SEE COMMENTS)     Chest tightness, dizziness and malaise.    Sulfamethoxazole W/Trimethoprim UNKNOWN    Tramadol OTHER (SEE COMMENTS)     Muscle pain     Trichophyton OTHER (SEE COMMENTS)    Lisinopril RASH    Simvastatin RASH

## 2024-11-16 DIAGNOSIS — R60.0 LOCALIZED EDEMA: ICD-10-CM

## 2024-11-19 ENCOUNTER — OFFICE VISIT (OUTPATIENT)
Dept: INTERNAL MEDICINE CLINIC | Facility: CLINIC | Age: 84
End: 2024-11-19

## 2024-11-19 VITALS
HEIGHT: 64 IN | WEIGHT: 184.38 LBS | HEART RATE: 54 BPM | SYSTOLIC BLOOD PRESSURE: 130 MMHG | BODY MASS INDEX: 31.48 KG/M2 | DIASTOLIC BLOOD PRESSURE: 60 MMHG

## 2024-11-19 DIAGNOSIS — R63.4 WEIGHT LOSS: Primary | ICD-10-CM

## 2024-11-19 DIAGNOSIS — M54.2 NECK PAIN: ICD-10-CM

## 2024-11-19 DIAGNOSIS — E66.09 CLASS 1 OBESITY DUE TO EXCESS CALORIES WITHOUT SERIOUS COMORBIDITY WITH BODY MASS INDEX (BMI) OF 31.0 TO 31.9 IN ADULT: ICD-10-CM

## 2024-11-19 DIAGNOSIS — E66.811 CLASS 1 OBESITY DUE TO EXCESS CALORIES WITHOUT SERIOUS COMORBIDITY WITH BODY MASS INDEX (BMI) OF 31.0 TO 31.9 IN ADULT: ICD-10-CM

## 2024-11-19 PROCEDURE — 99214 OFFICE O/P EST MOD 30 MIN: CPT | Performed by: NURSE PRACTITIONER

## 2024-11-19 RX ORDER — ATORVASTATIN CALCIUM 40 MG/1
40 TABLET, FILM COATED ORAL NIGHTLY
Qty: 90 TABLET | Refills: 2 | Status: SHIPPED | OUTPATIENT
Start: 2024-11-19

## 2024-11-19 RX ORDER — GABAPENTIN 100 MG/1
CAPSULE ORAL
Qty: 120 CAPSULE | Refills: 3 | Status: SHIPPED | OUTPATIENT
Start: 2024-11-19

## 2024-11-19 RX ORDER — METOPROLOL SUCCINATE 100 MG/1
100 TABLET, EXTENDED RELEASE ORAL DAILY
Qty: 90 TABLET | Refills: 3 | Status: SHIPPED | OUTPATIENT
Start: 2024-11-19

## 2024-11-19 RX ORDER — HYDRALAZINE HYDROCHLORIDE 25 MG/1
25 TABLET, FILM COATED ORAL 3 TIMES DAILY
Qty: 270 TABLET | Refills: 1 | Status: SHIPPED | OUTPATIENT
Start: 2024-11-19

## 2024-11-19 RX ORDER — FLECAINIDE ACETATE 50 MG/1
50 TABLET ORAL EVERY 12 HOURS
Qty: 180 TABLET | Refills: 3 | Status: SHIPPED | OUTPATIENT
Start: 2024-11-19

## 2024-11-19 RX ORDER — LOSARTAN POTASSIUM 50 MG/1
50 TABLET ORAL DAILY
Qty: 90 TABLET | Refills: 2 | Status: SHIPPED | OUTPATIENT
Start: 2024-11-19

## 2024-11-19 NOTE — ASSESSMENT & PLAN NOTE
Weight Loss Requested    Wt Readings from Last 6 Encounters:   11/19/24 184 lb 6.4 oz (83.6 kg)   11/12/24 183 lb (83 kg)   07/03/24 186 lb (84.4 kg)   06/25/24 186 lb 6.4 oz (84.6 kg)   05/27/22 175 lb (79.4 kg)   12/06/21 181 lb (82.1 kg)        Plan  Refer to weight loss specialist    Plant Protein diet given to patient

## 2024-11-19 NOTE — PROGRESS NOTES
HPI:    Patient ID: Stephanie Biswas is a 84 year old female.    HPI Follow up  84 year old female who I met last year.  She has a history of an atrial arrhythmia and has been on flecainide for many years. She has chronic kidney disease and in review of the chart it states at one time she has been in stage IV CKD.   Hx of HTN, HL,CKD Stage III, Lymphedema.    She is following with Dr Bobby Bobo for her kidney function.    Pinched Nerve in her neck  Shooting Pain in her arms intermittently    Family hx of Prader- Willi syndrome.  She is requesting a weight loss plan.    Gait Instability-  Has improved with physical therapy.  Immunization History   Administered Date(s) Administered    >=3 YRS TRI  MULTIDOSE VIAL (41125) FLU CLINIC 09/14/2009, 11/04/2010, 11/06/2011, 10/29/2012    Covid-19 Vaccine Pfizer 30 mcg/0.3 ml 01/30/2021, 02/20/2021, 10/04/2021, 11/01/2021, 10/14/2022    Covid-19 Vaccine Pfizer Clifford-Sucrose 30 mcg/0.3 ml 05/21/2022    FLU VAC High Dose 65 YRS & Older PRSV Free (37690) 10/04/2023    HEP A 06/09/2014    High Dose Fluzone Influenza Vaccine, 65yr+ PF 0.5mL (16417) 11/02/2015, 12/01/2016, 10/08/2018, 11/06/2019    Influenza 11/05/2014, 11/08/2015, 10/01/2020, 10/01/2021, 10/03/2022    Meningococcal (Menomune) 06/09/2014    Pfizer Covid-19 Vaccine 30mcg/0.3ml 12yrs+ 10/08/2023, 09/24/2024    Pneumococcal (Prevnar 13) 11/30/2015    Pneumovax 23 02/01/2006    RSV, recombinant, RSVpreF, adjuvanted (Arexvy) 11/27/2023    TDAP 11/04/2010, 06/09/2014, 06/25/2024    TYPHOID 06/09/2014    Zoster Vaccine Live (Zostavax) 12/11/2008, 12/12/2008       Past Medical History:    Anesthesia complication    Arrhythmia    atrial tachycardia    Back problem    High blood pressure    High cholesterol    PONV (postoperative nausea and vomiting)    SEASONAL ALLERGIES    VARICELLA      Past Surgical History:   Procedure Laterality Date    Colonoscopy  2010    wnl - next due in 2015    Electric stimulation therapy  Bilateral 2024    pain management    Spine surgery procedure unlisted  2020    Tonsillectomy      Tubal ligation  1975      Social History     Socioeconomic History    Marital status:     Number of children: 3   Occupational History    Occupation: Retired   Tobacco Use    Smoking status: Former    Smokeless tobacco: Never    Tobacco comments:     quit at age 41--smoked for 20yrs   Vaping Use    Vaping status: Never Used   Substance and Sexual Activity    Alcohol use: Yes     Alcohol/week: 1.0 standard drink of alcohol     Types: 1 Glasses of wine per week     Comment: occ    Drug use: Yes     Types: Cannabis    Sexual activity: Yes     Partners: Male     Comment: post menopause, 11/25/14: rarely,  has health issuse    Other Topics Concern    Caffeine Concern Yes     Comment: 2 cups of coffee in the morning    Exercise Yes          Review of Systems   Constitutional:  Negative for chills, fatigue and fever.   HENT:  Negative for ear pain, hearing loss, sinus pain, sore throat and trouble swallowing.    Eyes:  Negative for pain and visual disturbance.   Respiratory:  Negative for cough, chest tightness and shortness of breath.    Cardiovascular:  Negative for chest pain, palpitations and leg swelling.   Gastrointestinal:  Negative for abdominal pain, constipation, diarrhea, nausea and vomiting.   Endocrine: Negative for cold intolerance and heat intolerance.   Genitourinary:  Negative for dysuria and hematuria.   Musculoskeletal:  Positive for neck pain. Negative for back pain and joint swelling.   Skin:  Negative for rash.   Allergic/Immunologic: Negative for environmental allergies.   Neurological:  Negative for weakness, numbness and headaches.   Hematological:  Does not bruise/bleed easily.   Psychiatric/Behavioral:  Negative for dysphoric mood and sleep disturbance. The patient is not nervous/anxious.               Current Outpatient Medications   Medication Sig Dispense Refill    gabapentin 100 MG  Oral Cap Take 200 mg at night 120 capsule 3    metoprolol succinate  MG Oral Tablet 24 Hr Take 1 tablet (100 mg total) by mouth daily. 90 tablet 3    hydrALAZINE 25 MG Oral Tab Take 1 tablet (25 mg total) by mouth 3 (three) times daily. 270 tablet 1    atorvastatin (LIPITOR) 40 MG Oral Tab Take 1 tablet (40 mg total) by mouth nightly. Take 40 mg by mouth. 90 tablet 2    losartan 50 MG Oral Tab Take 1 tablet (50 mg total) by mouth daily. 90 tablet 2    flecainide 50 MG Oral Tab Take 1 tablet (50 mg total) by mouth Q12H. 180 tablet 3    Cholecalciferol 125 MCG (5000 UT) Oral Tab Take 1 tablet (5,000 Units total) by mouth daily.      cyanocobalamin 1000 MCG Oral Tab Take 1 tablet (1,000 mcg total) by mouth in the morning and 1 tablet (1,000 mcg total) before bedtime.      Calcium Citrate-Vitamin D (CALCIUM CITRATE PETITE/VIT D OR) Take 400 mg by mouth in the morning and 400 mg before bedtime.      spironolactone 25 MG Oral Tab TAKE 1 TABLET BY MOUTH DAILY AS NEEDED FOR SWELLING 30 tablet 2    melatonin 5 MG Oral Cap Take 1 capsule (5 mg total) by mouth nightly. 90 capsule 2    diclofenac 1 % External Gel Apply 1 g topically 3 (three) times daily. 1 each 3    Cranberry 1000 MG Oral Cap Take 1,000 mg by mouth daily. 90 capsule 0    acetaminophen 500 MG Oral Tab Take 1 tablet (500 mg total) by mouth every 4 (four) hours as needed for Pain.       Allergies:Allergies[1]   PHYSICAL EXAM:   Physical Exam  Constitutional:       Appearance: Normal appearance. She is well-developed.   HENT:      Head: Normocephalic.   Cardiovascular:      Rate and Rhythm: Normal rate and regular rhythm.      Heart sounds: Normal heart sounds. No murmur heard.     No friction rub. No gallop.   Pulmonary:      Effort: Pulmonary effort is normal. No respiratory distress.      Breath sounds: Normal breath sounds. No wheezing, rhonchi or rales.   Abdominal:      General: Bowel sounds are normal. There is no distension.      Palpations: Abdomen  is soft. There is no mass.      Tenderness: There is no abdominal tenderness. There is no right CVA tenderness, left CVA tenderness or guarding.   Musculoskeletal:         General: No tenderness.      Cervical back: Normal range of motion and neck supple. No tenderness.      Right lower leg: No edema.      Left lower leg: No edema.   Lymphadenopathy:      Cervical: No cervical adenopathy.   Skin:     General: Skin is warm and dry.      Findings: No rash.   Neurological:      Mental Status: She is alert and oriented to person, place, and time.      Coordination: Coordination normal.      Gait: Gait normal.   Psychiatric:         Mood and Affect: Mood normal.         Behavior: Behavior normal.         Thought Content: Thought content normal.         Judgment: Judgment normal.       /60 (BP Location: Right arm, Patient Position: Sitting, Cuff Size: adult)   Pulse 54   Ht 5' 4\" (1.626 m)   Wt 184 lb 6.4 oz (83.6 kg)   LMP 10/20/1993 (Exact Date)   BMI 31.65 kg/m²   Wt Readings from Last 2 Encounters:   11/19/24 184 lb 6.4 oz (83.6 kg)   11/12/24 183 lb (83 kg)     Body mass index is 31.65 kg/m².(2)  Lab Results   Component Value Date    WBC 10.0 06/28/2024    RBC 3.88 06/28/2024    HGB 11.9 (L) 06/28/2024    HCT 36.3 06/28/2024    MCV 93.6 06/28/2024    MCH 30.7 06/28/2024    MCHC 32.8 06/28/2024    RDW 14.5 06/28/2024    .0 06/28/2024      Lab Results   Component Value Date    GLU 94 06/28/2024    BUN 16 06/28/2024    BUNCREA 10.7 06/28/2024    CREATSERUM 1.49 (H) 06/28/2024    ANIONGAP 0 06/28/2024    GFRNAA 39 (L) 05/27/2022    GFRAA 45 (L) 05/27/2022    CA 9.8 06/28/2024    OSMOCALC 291 06/28/2024    ALKPHO 73 06/28/2024    AST 26 06/28/2024    ALT 20 06/28/2024    BILT 0.8 06/28/2024    TP 6.8 06/28/2024    ALB 4.4 06/28/2024    GLOBULIN 2.4 06/28/2024     06/28/2024    K 4.1 06/28/2024     06/28/2024    CO2 29.0 06/28/2024      No results found for: \"EAG\", \"A1C\"   Lab Results    Component Value Date    CHOLEST 153 06/28/2024    TRIG 72 06/28/2024    HDL 64 (H) 06/28/2024    LDL 75 06/28/2024    VLDL 11 06/28/2024    NONHDLC 89 06/28/2024      Lab Results   Component Value Date    TSH 2.809 06/28/2024                ASSESSMENT/PLAN:     Problem List Items Addressed This Visit       Class 1 obesity due to excess calories with body mass index (BMI) of 31.0 to 31.9 in adult     Weight Loss Requested    Wt Readings from Last 6 Encounters:   11/19/24 184 lb 6.4 oz (83.6 kg)   11/12/24 183 lb (83 kg)   07/03/24 186 lb (84.4 kg)   06/25/24 186 lb 6.4 oz (84.6 kg)   05/27/22 175 lb (79.4 kg)   12/06/21 181 lb (82.1 kg)        Plan  Refer to weight loss specialist    Plant Protein diet given to patient         Relevant Medications    atorvastatin (LIPITOR) 40 MG Oral Tab    Neck pain     Neck pain with intermittent pain down both arms    1) Sleep with a rolled towel under your neck, no pillow, on your back on a firm mattress         Physical Therapy referral          Relevant Medications    gabapentin 100 MG Oral Cap    Other Relevant Orders    PHYSICAL THERAPY - INTERNAL     Other Visit Diagnoses       Weight loss    -  Primary    Relevant Medications    atorvastatin (LIPITOR) 40 MG Oral Tab    Other Relevant Orders    formerly [facility] - Taylor Pendleton MD [address] [place] [address]               No orders of the defined types were placed in this encounter.      Meds This Visit:  Requested Prescriptions     Signed Prescriptions Disp Refills    gabapentin 100 MG Oral Cap 120 capsule 3     Sig: Take 200 mg at night    metoprolol succinate  MG Oral Tablet 24 Hr 90 tablet 3     Sig: Take 1 tablet (100 mg total) by mouth daily.    hydrALAZINE 25 MG Oral Tab 270 tablet 1     Sig: Take 1 tablet (25 mg total) by mouth 3 (three) times daily.    atorvastatin (LIPITOR) 40 MG Oral Tab 90 tablet 2     Sig: Take 1 tablet (40 mg total) by mouth nightly. Take 40 mg by mouth.    losartan 50  MG Oral Tab 90 tablet 2     Sig: Take 1 tablet (50 mg total) by mouth daily.    flecainide 50 MG Oral Tab 180 tablet 3     Sig: Take 1 tablet (50 mg total) by mouth Q12H.       Imaging & Referrals:  BARIATRICS - INTERNAL  PHYSICAL THERAPY - INTERNAL         CARLOS A Parkinson          [1]   Allergies  Allergen Reactions    Ciprofloxacin OTHER (SEE COMMENTS)     Past allergic reaction ? type  Past allergic reaction ? type      Macrobid [Nitrofurantoin] WHEEZING    Pravastatin RASH     rash    Sulfa Antibiotics OTHER (SEE COMMENTS)     Bone aching  Shortness of breath    Pravachol [Pravastatin Sodium] HIVES    Cephalosporins UNKNOWN and OTHER (SEE COMMENTS)    Fentanyl OTHER (SEE COMMENTS)     Stiff leg syndrome     Keflex [Cephalexin] OTHER (SEE COMMENTS)     Bilateral leg swelling    Norvasc [Amlodipine] OTHER (SEE COMMENTS)     Chest tightness, dizziness and malaise.    Sulfamethoxazole W/Trimethoprim UNKNOWN    Tramadol OTHER (SEE COMMENTS)     Muscle pain     Trichophyton OTHER (SEE COMMENTS)    Lisinopril RASH    Simvastatin RASH

## 2024-11-19 NOTE — ASSESSMENT & PLAN NOTE
Neck pain with intermittent pain down both arms    1) Sleep with a rolled towel under your neck, no pillow, on your back on a firm mattress         Physical Therapy referral

## 2024-11-20 DIAGNOSIS — R60.0 LOCALIZED EDEMA: ICD-10-CM

## 2024-11-21 RX ORDER — SPIRONOLACTONE 25 MG/1
TABLET ORAL
Qty: 30 TABLET | Refills: 2 | OUTPATIENT
Start: 2024-11-21

## 2024-11-21 RX ORDER — SPIRONOLACTONE 25 MG/1
TABLET ORAL
Qty: 30 TABLET | Refills: 2 | Status: SHIPPED | OUTPATIENT
Start: 2024-11-21

## 2024-11-21 NOTE — TELEPHONE ENCOUNTER
Please review; protocol failed/ has no protocol    Requested Prescriptions   Pending Prescriptions Disp Refills    SPIRONOLACTONE 25 MG Oral Tab [Pharmacy Med Name: SPIRONOLACTONE 25MG TABLETS] 30 tablet 2     Sig: TAKE 1 TABLET BY MOUTH DAILY AS NEEDED FOR SWELLING       Hypertension Medications Protocol Failed - 11/21/2024 10:29 AM        Failed - EGFRCR or GFRNAA > 50     GFR Evaluation  EGFRCR: 35 , resulted on 6/28/2024          Passed - CMP or BMP in past 12 months        Passed - Last BP reading less than 140/90     BP Readings from Last 1 Encounters:   11/19/24 130/60               Passed - In person appointment or virtual visit in the past 12 mos or appointment in next 3 mos     Recent Outpatient Visits              2 days ago Weight loss    [facility] Susy Quick APRN    Office Visit    1 week ago Stage 3 chronic kidney disease, unspecified whether stage 3a or 3b CKD (HCC)    [facility] Melvin Jasso MD    Office Visit    4 months ago Polyarthralgia    [facility] Sheryl Garcia MD    Office Visit    4 months ago Visit for screening mammogram    [facility] Susy Quick APRN    Office Visit    2 years ago Essential hypertension    Internal [facility] - [address], [place], Mary, NP    Office Visit          Future Appointments         Provider Department Appt Notes    In 4 weeks Taylor Pendleton MD [facility] NP Weight mgmt    In 3 months Melvin Jasso MD [facility] f/u from consult 11/12/24; U/S, labs    In 7 months Susy Quick APRN [facility] AWV never done                       Recent Outpatient Visits              2 days ago [facility] loss    [facility]    Office Visit    1 week ago Stage 3 chronic kidney disease, unspecified whether stage 3a or 3b CKD (HCC)    [facility] Melvin Jasso MD    Office Visit    4 months ago [facility]    Office Visit    4 months ago Visit for screening mammogram    [facility]    Office Visit    2 years ago Essential hypertension    Internal Medicine - [address], [place], Mary, NP    Office Visit          Future Appointments         Provider Department Appt Notes    In 4 weeks Taylor Pendleton MD [facility] NP Weight mgmt    In 3 months Melvin Jasso MD [facility] f/u from consult 11/12/24; U/S, labs    In 7 months Susy Quick APRN [facility] AWV never done

## 2024-12-19 ENCOUNTER — LAB ENCOUNTER (OUTPATIENT)
Dept: LAB | Age: 84
End: 2024-12-19
Attending: INTERNAL MEDICINE
Payer: MEDICARE

## 2024-12-19 ENCOUNTER — OFFICE VISIT (OUTPATIENT)
Age: 84
End: 2024-12-19
Payer: MEDICARE

## 2024-12-19 VITALS
BODY MASS INDEX: 31.14 KG/M2 | WEIGHT: 182.38 LBS | DIASTOLIC BLOOD PRESSURE: 82 MMHG | OXYGEN SATURATION: 96 % | HEIGHT: 64.3 IN | SYSTOLIC BLOOD PRESSURE: 150 MMHG | HEART RATE: 52 BPM

## 2024-12-19 DIAGNOSIS — I10 ESSENTIAL HYPERTENSION: ICD-10-CM

## 2024-12-19 DIAGNOSIS — R73.03 PREDIABETES: ICD-10-CM

## 2024-12-19 DIAGNOSIS — M96.1 POSTLAMINECTOMY SYNDROME OF LUMBAR REGION: ICD-10-CM

## 2024-12-19 DIAGNOSIS — N18.32 STAGE 3B CHRONIC KIDNEY DISEASE (HCC): Primary | ICD-10-CM

## 2024-12-19 DIAGNOSIS — E66.811 CLASS 1 OBESITY DUE TO EXCESS CALORIES WITHOUT SERIOUS COMORBIDITY WITH BODY MASS INDEX (BMI) OF 31.0 TO 31.9 IN ADULT: ICD-10-CM

## 2024-12-19 DIAGNOSIS — I47.19 ATRIAL TACHYCARDIA (HCC): ICD-10-CM

## 2024-12-19 DIAGNOSIS — E78.2 MIXED HYPERLIPIDEMIA: ICD-10-CM

## 2024-12-19 DIAGNOSIS — E66.09 CLASS 1 OBESITY DUE TO EXCESS CALORIES WITHOUT SERIOUS COMORBIDITY WITH BODY MASS INDEX (BMI) OF 31.0 TO 31.9 IN ADULT: ICD-10-CM

## 2024-12-19 LAB
EST. AVERAGE GLUCOSE BLD GHB EST-MCNC: 120 MG/DL (ref 68–126)
HBA1C MFR BLD: 5.8 % (ref ?–5.7)

## 2024-12-19 PROCEDURE — 36415 COLL VENOUS BLD VENIPUNCTURE: CPT

## 2024-12-19 PROCEDURE — 83036 HEMOGLOBIN GLYCOSYLATED A1C: CPT

## 2024-12-19 PROCEDURE — 99214 OFFICE O/P EST MOD 30 MIN: CPT | Performed by: INTERNAL MEDICINE

## 2024-12-19 RX ORDER — TOPIRAMATE 25 MG/1
25 TABLET, FILM COATED ORAL
Qty: 30 TABLET | Refills: 0 | Status: SHIPPED | OUTPATIENT
Start: 2024-12-19

## 2024-12-19 NOTE — PROGRESS NOTES
CC:   Chief Complaint   Patient presents with    Consult    Weight Management        Referring Physician to whom this note will be reported back to: Rodriguez Conklin MD   Reason for medical consultation: Medical Management of Weight and Weight related comorbidities.      HPI:   -during menopause she gained about 40 lbs  -also she had sciatica since 2019, s/p laminectomy, that did not work for her, and next year she got the other side      Body mass index is 31.02 kg/m².   Wt Readings from Last 6 Encounters:   12/19/24 182 lb 6.4 oz (82.7 kg)   11/19/24 184 lb 6.4 oz (83.6 kg)   11/12/24 183 lb (83 kg)   07/03/24 186 lb (84.4 kg)   06/25/24 186 lb 6.4 oz (84.6 kg)   05/27/22 175 lb (79.4 kg)     /82   Pulse 52   Ht 5' 4.3\" (1.633 m)   Wt 182 lb 6.4 oz (82.7 kg)   LMP 10/20/1993 (Exact Date)   SpO2 96%   BMI 31.02 kg/m²   Vitals:    12/19/24 1130   BP: 150/82   Pulse: 52     Body mass index is 31.02 kg/m².  Waist Circumference: 40 inches       Typical Dietary Intake:  Breakfast AM Snack Lunch PM Snack Dinner   Toast and jelly none Apple and PB skip Meat, vegetables, carbs     Soda Drinker?: No    Number of restaurant or fast food meals/week:  1 meals/week    LABS and RESULTS:     Office Visit on 07/03/2024   Component Date Value    C-Reactive Protein 07/03/2024 <0.40     C-Citrullinated Peptide * 07/03/2024 1.7     Sed Rate 07/03/2024 8     Rheumatoid Factor 07/03/2024 10.2    [facility] Lab Encounter on 06/28/2024   Component Date Value    Glucose 06/28/2024 94     Sodium 06/28/2024 140     Potassium 06/28/2024 4.1     Chloride 06/28/2024 111     CO2 06/28/2024 29.0     Anion Gap 06/28/2024 0     BUN 06/28/2024 16     Creatinine 06/28/2024 1.49 (H)     BUN/CREA Ratio 06/28/2024 10.7     Calcium, Total 06/28/2024 9.8     Calculated Osmolality 06/28/2024 291     eGFR-Cr 06/28/2024 35 (L)     ALT 06/28/2024 20     AST 06/28/2024 26     Alkaline Phosphatase 06/28/2024 73     Bilirubin, Total 06/28/2024 0.8      Total Protein 06/28/2024 6.8     Albumin 06/28/2024 4.4     Globulin  06/28/2024 2.4     A/G Ratio 06/28/2024 1.8     Patient Fasting for CMP? 06/28/2024 No     Cholesterol, Total 06/28/2024 153     HDL Cholesterol 06/28/2024 64 (H)     Triglycerides 06/28/2024 72     LDL Cholesterol 06/28/2024 75     VLDL 06/28/2024 11     Non HDL Chol 06/28/2024 89     Patient Fasting for Lipi* 06/28/2024 No     Vitamin D, 25OH, Total 06/28/2024 72.0     Vitamin B12 06/28/2024 1,549 (H)     TSH 06/28/2024 2.809     WBC 06/28/2024 10.0     RBC 06/28/2024 3.88     HGB 06/28/2024 11.9 (L)     HCT 06/28/2024 36.3     MCV 06/28/2024 93.6     MCH 06/28/2024 30.7     MCHC 06/28/2024 32.8     RDW 06/28/2024 14.5     RDW-SD 06/28/2024 49.4 (H)     PLT 06/28/2024 160.0        Current Outpatient Medications   Medication Sig Dispense Refill    topiramate 25 MG Oral Tab Take 1 tablet (25 mg total) by mouth Every afternoon at 2:00 pm. 30 tablet 0    spironolactone 25 MG Oral Tab TAKE 1 TABLET BY MOUTH DAILY AS NEEDED FOR SWELLING 30 tablet 2    gabapentin 100 MG Oral Cap Take 200 mg at night 120 capsule 3    metoprolol succinate  MG Oral Tablet 24 Hr Take 1 tablet (100 mg total) by mouth daily. 90 tablet 3    hydrALAZINE 25 MG Oral Tab Take 1 tablet (25 mg total) by mouth 3 (three) times daily. 270 tablet 1    atorvastatin (LIPITOR) 40 MG Oral Tab Take 1 tablet (40 mg total) by mouth nightly. Take 40 mg by mouth. 90 tablet 2    losartan 50 MG Oral Tab Take 1 tablet (50 mg total) by mouth daily. 90 tablet 2    flecainide 50 MG Oral Tab Take 1 tablet (50 mg total) by mouth Q12H. 180 tablet 3    Cholecalciferol 125 MCG (5000 UT) Oral Tab Take 1 tablet (5,000 Units total) by mouth daily.      cyanocobalamin 1000 MCG Oral Tab Take 1 tablet (1,000 mcg total) by mouth in the morning and 1 tablet (1,000 mcg total) before bedtime.      Calcium Citrate-Vitamin D (CALCIUM CITRATE PETITE/VIT D OR) Take 400 mg by mouth in the morning and 400 mg before  bedtime.      melatonin 5 MG Oral Cap Take 1 capsule (5 mg total) by mouth nightly. 90 capsule 2    diclofenac 1 % External Gel Apply 1 g topically 3 (three) times daily. 1 each 3    Cranberry 1000 MG Oral Cap Take 1,000 mg by mouth daily. 90 capsule 0    acetaminophen 500 MG Oral Tab Take 1 tablet (500 mg total) by mouth every 4 (four) hours as needed for Pain.        Past Medical History:    Anesthesia complication    Arrhythmia    atrial tachycardia    Back problem    High blood pressure    High cholesterol    PONV (postoperative nausea and vomiting)    SEASONAL ALLERGIES    VARICELLA       Past Surgical History:   Procedure Laterality Date    Colonoscopy  2010    wnl - next due in 2015    Electric stimulation therapy Bilateral 2024    pain management    Spine surgery procedure unlisted  2020    Tonsillectomy      Tubal ligation  1975       Social History:  Social History     Socioeconomic History    Marital status:     Number of children: 3   Occupational History    Occupation: Retired   Tobacco Use    Smoking status: Former    Smokeless tobacco: Never    Tobacco comments:     quit at age 41--smoked for 20yrs   Vaping Use    Vaping status: Never Used   Substance and Sexual Activity    Alcohol use: Yes     Alcohol/week: 1.0 standard drink of alcohol     Types: 1 Glasses of wine per week     Comment: occ    Drug use: Yes     Types: Cannabis    Sexual activity: Yes     Partners: Male     Comment: post menopause, 11/25/14: rarely,  has health issuse    Other Topics Concern    Caffeine Concern Yes     Comment: 2 cups of coffee in the morning    Exercise Yes   Social History Narrative    The patient uses the following assistive device(s):  single-point cane.      The patient does live in a home with stairs.     Social Drivers of Health     Food Insecurity: Low Risk  (6/18/2021)    Received from [facility]    Food Insecurity     Have there been times that your food ran out, and you  didn't have money to get more?: No     Are there times that you worry that this might happen?: No   Transportation Needs: Low Risk  (6/18/2021)    Received from [facility]    Transportation Needs     Do you have trouble getting transportation to medical appointments?: No     How do you normally get to and from your appointments?: Other   Social Connections: Low Risk  (6/18/2021)    Received from [facility]    Social Connections     Do you have someone you could call for help if needed?: Yes    Received from [facility]     Family History:  Family History   Problem Relation Age of Onset    Stroke Mother     Other (Other) Father         brain tumor    Hypertension Sister     Breast Cancer Paternal Aunt 94          REVIEW OF SYSTEMS:   10 point review of systems otherwise negative.  With the exception of HPI and assessment and plan        EXAM:     GENERAL: NAD, conversant  SKIN: good turgor, no jaundice  HEENT: nl external nose and ears  NECK: supple  LUNGS: nl effort, clear to auscultation bilaterally  CARDIO: RRR, no murmur  ABDOMEN: NT/ND, no masses  EXTREMITIES: gait normal, no edema   PSYCH: Oriented times three, appropriate affect              ASSESSMENT AND PLAN:     1. Stage 3b chronic kidney disease (HCC)  2. Atrial tachycardia (HCC)  3. Essential hypertension  4. Class 1 obesity due to excess calories without serious comorbidity with body mass index (BMI) of 31.0 to 31.9 in adult  5. Postlaminectomy syndrome of lumbar region  6. Mixed hyperlipidemia  7. Prediabetes    - Initial weight 182 lb 6.4 oz (82.7 kg), Initial Body mass index is 31.02 kg/m².  - The patient participated in a comprehensive weight management program that encourages behavioral modification, reduced calorie diet and increased physical activity with continuing follow up for at least 6 months prior to using drug therapy.   -labs with Cr 1.2, GFR 35, normal bmp, HDL 64, A1c  5.8  -Topiramate; patient denies any personal history of Suicide and Kidney stones, glaucoma, pregnancy. Discussed getting annual eye exams to rule out glaucoma.    Plan:    - topiramate 25 MG Oral Tab; Take 1 tablet (25 mg total) by mouth Every afternoon at 2:00 pm.  Dispense: 30 tablet; Refill: 0       Regarding Obesity:  Follow up with dietitian and psychologist as recommended.  Discussed the role of sleep and stress in weight management.  Labs orders as above.  Counseled on comprehensive weight loss plan including attention to nutrition, exercise and behavior/stress management for success. See patient instruction below for more details.  Weight Loss Consent to treat reviewed and signed.    Regarding weight loss Medications.  Medication use and side effects reviewed with patient.    Medication will be used with a reduced calorie diet and increased physical activity in the management of exogenous obesity.  Patient will be responsible to let me know of any side effects or complications with medications.             Return in about 4 weeks (around 1/16/2025).     Taylor Pendleton MD

## 2024-12-19 NOTE — PATIENT INSTRUCTIONS
Please try to work on the following dietary changes:  Goals: Aim for 20-30 grams of protein/ meal  Aim for <100 grams of carbohydrates/day  Eat 4-6 vegetables/day  Avoid skipping meals- eat every 4-5 hours  Aim for 3 meals/day  2. Drink lots of water and cut down on soda/juice consumption if soda/juice drinker  3. Focus on protein: (15-30 grams with each meal) ie. greek yogurt, cottage cheese, string cheese, hard boiled eggs  4. Healthy snacks: always have protein in your snack! peanut butter and apples, hummus and carrots, berries, nuts (1/4 cup), tuna and crackers                 Protein Shakes: Premier protein or Core Power                Protein Bars: Rx Bars, Oatmega, Power Crunch                 S[place]ento balanced breaks (cheese and nuts)- without chocolate  5. Reduce carbohydrates <100 grams which includes sweets as well as rice, pasta, potatoes, bread, corn and instead choose whole grain options or more protein or vegetables (4-6 servings of vegetables per day). Use [employer] kai for carb counting!  6. Get a good night of sleep  7. Try to decrease stress in life; meditate at least 10 minutes before sleeping! Try it and let me know what works for you, try youLogicalwareube or apps like Calm and Bar Saint!     Please download apps:  1. \"My Fitness Pal\" (other option is Lose it)) to help you to monitor daily dietary intake and you will be able to see if you are eating the right amount of calories, protein, carbs                With My Fitness Pal-->When you set-up the kai or need to adjust settings:                Goals should include:                 Lose 1.5-2 lbs per week                Activity level: not very active (can't count exercise towards calorie number per day)                   ** Daily INPUT> Look at nutrition section-- \"nutrients\" and it will break down your macros for the day (ie. Protein, carbs, fibers, sugars and fats). Try to stay within these numbers daily     2. \"7 minute workout\" to help with  exercise/activity which takes 7 minutes of your day and that you can do at home!   3. \"Calm\" or \"Headspace\" which helps with mindfulness, meditation, clarity, sleep, and shelli to your daily life.   4. Skinnytaste blog for healthy recipe ideas  5. DietWistone for low carb resources     HIGH PROTEIN SNACK IDEAS  -cottage cheese  -plain yogurt  -kefir  -hard-boiled eggs  -natural cheeses  -nuts (measure portion size)   -unsweetened nut butters  -dried edamame   -martina seeds soaked in water or almond milk  -soy nuts  -cured meats (monitor for sodium issues)   -hummus with vegetables  -bean dip with vegetables     FRUIT  Low carb fruit options   Raspberries: Half a cup (60 grams) contains 3 grams of carbs.  Blackberries: Half a cup (70 grams) contains 4 grams of carbs.  Strawberries: Half a cup (100 grams) contains 6 grams of carbs.  Blueberries: Half a cup (50 grams) contains 6 grams of carbs.  Plum: One medium-sized (80 grams) contains 6 grams of carbs.     VEGETABLES  Low carb vegetables             Understanding Carbohydrates  Goal <100g  A car needs the right type of fuel to run. And you need the right kind of food to function. To keep your energy level up, your body needs food that has carbohydrates (carbs). But carbs raise blood sugar levels higher and faster than other kinds of food. Your dietitian will work with you to figure out the amount of carbs youneed. Carbs come in 3 types: starches, sugars, and fiber.   Starches  Starches are found in grains, some vegetables, and beans. Grain products include bread, pasta, cereal, and tortillas. Starchy vegetables include potatoes, peas, corn, lima beans, yams, and squash. Kidney beans, mckinney beans,black beans, garbanzo beans, and lentils also have starches.     Sugars  Sugars are found naturally in many foods. Or they can be added. Foods that contain natural sugar include fruits and fruit juices, dairy products, honey, and molasses. Added sugars are found in most  desserts, processed foods, candy, regular soda, and fruit drinks. These are very helpful to treat low blood sugar (hypoglycemia). They give you sugar quickly. Try to keep at least 15 to 20 grams of these simple sugars with you at all times. Eat or drink these if you start to havesymptoms of low blood sugar.   Fiber  Fiber comes from plant foods. Your body can't digest most fiber. Instead of raising blood sugar levels like other carbs, fiber stops blood sugar from rising too fast. Fiber is found in fruits, vegetables, whole grains, beans,peas, and many nuts.   Carb counting  Keep track of the amount of carbs you eat. This can help you keep the right balance of carbs, physical activity, and medicine. The amount of carbs you need will be different from what other people need. How much you need depends on many things. These include your health, the medicines you take, and how active you are. Your healthcare team will help you figure out the right amount of carbs for you. You may start with 45 to 60 grams of carbs per meal, depending on your case. Carb counting is a system that helps you keep track of thecarbohydrates you eat at each meal.   Carbs come from many foods. These include grains, starchy vegetables, fruit, milk, beans, and snack foods. You can either count carbohydrate grams or carbohydrate servings. When you count carbohydrate servings, 1 carbohydrateserving = 15 grams of carbohydrates.   Here are some examples of foods that have about 15 grams of carbs (1 serving of carbohydrates):   1/2 cup of canned or frozen fruit  A small piece of fresh fruit (4 ounces)  1 slice of bread  1/2 cup of oatmeal  1/3 cup of rice  4 to 6 crackers  1/2 English muffin  1/2 cup of black beans  1/4 of a large baked potato (3 ounces)  2/3 cup of plain fat-free yogurt  1 cup of soup  1/2 cup of casserole  6 chicken nuggets  2-inch-square brownie or cake without frosting  2 small cookies  1/2 cup of ice cream or sherbet  Carb  counting is easier when food labels are available. Look at the label to see how many grams of total carbs per serving the food contains. Then you can figure out how much you should eat. If your food doesn't have a nutrition label, you should be able to get an idea how many carbs there are per servingby using a book or website.   Two very important lines to look at on the label are the serving size and the total carbohydrate amount per serving. Here are some tips for using food labelsto count your carbs:   Check the serving size. The information on the label is based on that serving size. If you eat more than the listed serving size, you may have to double or triple the other information on the label.   Check the total grams of carbs.  Total carbohydrate from the label includes sugar, starch, and fiber. Be sure to use the total carbohydrate number (minus the fiber) and not sugar alone.  Know how many grams of carbs you can have.  Be familiar with the matching portion sizes.  Compare labels. Compare the labels of different products. Look at serving sizes and total carbs to find the products that work best for you.   Don't forget protein and fat. With the focus on carb counting, it might be easy to forget protein and fat in your meals. Don't forget to include sources of protein and healthy fat to balance your meals. Also watch how much salt (sodium) you eat. This is especially true if you have high blood pressure. If you have diabetes, limit the amount of sodium to less than 2,300 mg a day.    It’s also important to be consistent with the amount of carbs and time you eat when taking a fixed dose of diabetes medicine. Work with your healthcare provider or dietitian if you need more help. They can help you keep track of your carbs. They can also help you figure out how many grams of carbs youshould have.

## 2025-01-17 ENCOUNTER — OFFICE VISIT (OUTPATIENT)
Dept: SURGERY | Facility: CLINIC | Age: 85
End: 2025-01-17
Payer: MEDICARE

## 2025-01-17 VITALS
WEIGHT: 181 LBS | DIASTOLIC BLOOD PRESSURE: 74 MMHG | SYSTOLIC BLOOD PRESSURE: 136 MMHG | BODY MASS INDEX: 30.9 KG/M2 | OXYGEN SATURATION: 95 % | HEART RATE: 54 BPM | HEIGHT: 64.3 IN

## 2025-01-17 DIAGNOSIS — Z51.81 THERAPEUTIC DRUG MONITORING: ICD-10-CM

## 2025-01-17 DIAGNOSIS — I47.19 ATRIAL TACHYCARDIA (HCC): ICD-10-CM

## 2025-01-17 DIAGNOSIS — R73.03 PREDIABETES: ICD-10-CM

## 2025-01-17 DIAGNOSIS — E66.811 CLASS 1 OBESITY DUE TO EXCESS CALORIES WITHOUT SERIOUS COMORBIDITY WITH BODY MASS INDEX (BMI) OF 31.0 TO 31.9 IN ADULT: ICD-10-CM

## 2025-01-17 DIAGNOSIS — E78.2 MIXED HYPERLIPIDEMIA: ICD-10-CM

## 2025-01-17 DIAGNOSIS — N18.32 STAGE 3B CHRONIC KIDNEY DISEASE (HCC): Primary | ICD-10-CM

## 2025-01-17 DIAGNOSIS — I10 ESSENTIAL HYPERTENSION: ICD-10-CM

## 2025-01-17 DIAGNOSIS — E66.09 CLASS 1 OBESITY DUE TO EXCESS CALORIES WITHOUT SERIOUS COMORBIDITY WITH BODY MASS INDEX (BMI) OF 31.0 TO 31.9 IN ADULT: ICD-10-CM

## 2025-01-17 PROCEDURE — 99214 OFFICE O/P EST MOD 30 MIN: CPT | Performed by: INTERNAL MEDICINE

## 2025-01-17 RX ORDER — TOPIRAMATE 25 MG/1
25 TABLET, FILM COATED ORAL EVERY EVENING
Qty: 90 TABLET | Refills: 0 | Status: SHIPPED | OUTPATIENT
Start: 2025-01-17 | End: 2025-04-17

## 2025-01-17 NOTE — PROGRESS NOTES
CC:   Chief Complaint   Patient presents with    Follow - Up    Weight Management        Referring Physician to whom this note will be reported back to: Rodriguez Conklin MD   Reason for medical consultation: Medical Management of Weight and Weight related comorbidities.      HPI:   Patient leaving to [place] from feb-May, will give 90 days refill    Initial weight: 182 lbs 12/2024  Current weight: 181 lbs  Interval weight loss: 1 lbs  Total weight loss: 1 lbs      Body mass index is 30.78 kg/m².   Wt Readings from Last 6 Encounters:   01/17/25 181 lb (82.1 kg)   12/19/24 182 lb 6.4 oz (82.7 kg)   11/19/24 184 lb 6.4 oz (83.6 kg)   11/12/24 183 lb (83 kg)   07/03/24 186 lb (84.4 kg)   06/25/24 186 lb 6.4 oz (84.6 kg)     /74   Pulse 54   Ht 5' 4.3\" (1.633 m)   Wt 181 lb (82.1 kg)   LMP 10/20/1993 (Exact Date)   SpO2 95%   BMI 30.78 kg/m²   Vitals:    01/17/25 1040   BP: 136/74   Pulse: 54     Body mass index is 30.78 kg/m².          Typical Dietary Intake:  Breakfast AM Snack Lunch PM Snack Dinner   Toast and jelly none Apple and PB skip Meat, vegetables, carbs     Soda Drinker?: No    Number of restaurant or fast food meals/week:  1 meals/week    LABS and RESULTS:     Office Visit on 07/03/2024   Component Date Value    C-Reactive Protein 07/03/2024 <0.40     C-Citrullinated Peptide * 07/03/2024 1.7     Sed Rate 07/03/2024 8     Rheumatoid Factor 07/03/2024 10.2    [facility] Lab Encounter on 06/28/2024   Component Date Value    Glucose 06/28/2024 94     Sodium 06/28/2024 140     Potassium 06/28/2024 4.1     Chloride 06/28/2024 111     CO2 06/28/2024 29.0     Anion Gap 06/28/2024 0     BUN 06/28/2024 16     Creatinine 06/28/2024 1.49 (H)     BUN/CREA Ratio 06/28/2024 10.7     Calcium, Total 06/28/2024 9.8     Calculated Osmolality 06/28/2024 291     eGFR-Cr 06/28/2024 35 (L)     ALT 06/28/2024 20     AST 06/28/2024 26     Alkaline Phosphatase 06/28/2024 73     Bilirubin, Total 06/28/2024 0.8     Total  Protein 06/28/2024 6.8     Albumin 06/28/2024 4.4     Globulin  06/28/2024 2.4     A/G Ratio 06/28/2024 1.8     Patient Fasting for CMP? 06/28/2024 No     Cholesterol, Total 06/28/2024 153     HDL Cholesterol 06/28/2024 64 (H)     Triglycerides 06/28/2024 72     LDL Cholesterol 06/28/2024 75     VLDL 06/28/2024 11     Non HDL Chol 06/28/2024 89     Patient Fasting for Lipi* 06/28/2024 No     Vitamin D, 25OH, Total 06/28/2024 72.0     Vitamin B12 06/28/2024 1,549 (H)     TSH 06/28/2024 2.809     WBC 06/28/2024 10.0     RBC 06/28/2024 3.88     HGB 06/28/2024 11.9 (L)     HCT 06/28/2024 36.3     MCV 06/28/2024 93.6     MCH 06/28/2024 30.7     MCHC 06/28/2024 32.8     RDW 06/28/2024 14.5     RDW-SD 06/28/2024 49.4 (H)     PLT 06/28/2024 160.0        Current Outpatient Medications   Medication Sig Dispense Refill    topiramate 25 MG Oral Tab Take 1 tablet (25 mg total) by mouth Every afternoon at 2:00 pm. 30 tablet 0    spironolactone 25 MG Oral Tab TAKE 1 TABLET BY MOUTH DAILY AS NEEDED FOR SWELLING 30 tablet 2    gabapentin 100 MG Oral Cap Take 200 mg at night 120 capsule 3    metoprolol succinate  MG Oral Tablet 24 Hr Take 1 tablet (100 mg total) by mouth daily. 90 tablet 3    hydrALAZINE 25 MG Oral Tab Take 1 tablet (25 mg total) by mouth 3 (three) times daily. 270 tablet 1    atorvastatin (LIPITOR) 40 MG Oral Tab Take 1 tablet (40 mg total) by mouth nightly. Take 40 mg by mouth. 90 tablet 2    losartan 50 MG Oral Tab Take 1 tablet (50 mg total) by mouth daily. 90 tablet 2    flecainide 50 MG Oral Tab Take 1 tablet (50 mg total) by mouth Q12H. 180 tablet 3    Cholecalciferol 125 MCG (5000 UT) Oral Tab Take 1 tablet (5,000 Units total) by mouth daily.      cyanocobalamin 1000 MCG Oral Tab Take 1 tablet (1,000 mcg total) by mouth in the morning and 1 tablet (1,000 mcg total) before bedtime.      Calcium Citrate-Vitamin D (CALCIUM CITRATE PETITE/VIT D OR) Take 400 mg by mouth in the morning and 400 mg before  bedtime.      melatonin 5 MG Oral Cap Take 1 capsule (5 mg total) by mouth nightly. 90 capsule 2    diclofenac 1 % External Gel Apply 1 g topically 3 (three) times daily. 1 each 3    Cranberry 1000 MG Oral Cap Take 1,000 mg by mouth daily. 90 capsule 0    acetaminophen 500 MG Oral Tab Take 1 tablet (500 mg total) by mouth every 4 (four) hours as needed for Pain.        Past Medical History:    Anesthesia complication    Arrhythmia    atrial tachycardia    Back problem    High blood pressure    High cholesterol    PONV (postoperative nausea and vomiting)    SEASONAL ALLERGIES    VARICELLA       Past Surgical History:   Procedure Laterality Date    Colonoscopy  2010    wnl - next due in 2015    Electric stimulation therapy Bilateral 2024    pain management    Spine surgery procedure unlisted  2020    Tonsillectomy      Tubal ligation  1975       Social History:  Social History     Socioeconomic History    Marital status:     Number of children: 3   Occupational History    Occupation: Retired   Tobacco Use    Smoking status: Former    Smokeless tobacco: Never    Tobacco comments:     quit at age 41--smoked for 20yrs   Vaping Use    Vaping status: Never Used   Substance and Sexual Activity    Alcohol use: Yes     Alcohol/week: 1.0 standard drink of alcohol     Types: 1 Glasses of wine per week     Comment: occ    Drug use: Yes     Types: Cannabis    Sexual activity: Yes     Partners: Male     Comment: post menopause, 11/25/14: rarely,  has health issuse    Other Topics Concern    Caffeine Concern Yes     Comment: 2 cups of coffee in the morning    Exercise Yes   Social History Narrative    The patient uses the following assistive device(s):  single-point cane.      The patient does live in a home with stairs.     Social Drivers of Health     Food Insecurity: Low Risk  (6/18/2021)    Received from [facility]    Food Insecurity     Have there been times that your food ran out, and you  didn't have money to get more?: No     Are there times that you worry that this might happen?: No   Transportation Needs: Low Risk  (6/18/2021)    Received from [facility]    Transportation Needs     Do you have trouble getting transportation to medical appointments?: No     How do you normally get to and from your appointments?: Other   Social Connections: Low Risk  (6/18/2021)    Received from [facility]    Social Connections     Do you have someone you could call for help if needed?: Yes    Received from [facility]     Family History:  Family History   Problem Relation Age of Onset    Stroke Mother     Other (Other) Father         brain tumor    Hypertension Sister     Breast Cancer Paternal Aunt 94          REVIEW OF SYSTEMS:   10 point review of systems otherwise negative.  With the exception of HPI and assessment and plan        EXAM:     GENERAL: NAD, conversant  SKIN: good turgor, no jaundice  HEENT: nl external nose and ears  NECK: supple  LUNGS: nl effort, clear to auscultation bilaterally  CARDIO: RRR, no murmur  ABDOMEN: NT/ND, no masses  EXTREMITIES: gait normal, no edema   PSYCH: Oriented times three, appropriate affect              ASSESSMENT AND PLAN:     1. Stage 3b chronic kidney disease (HCC)  2. Atrial tachycardia (HCC)  3. Essential hypertension  4. Class 1 obesity due to excess calories without serious comorbidity with body mass index (BMI) of 31.0 to 31.9 in adult  5. Postlaminectomy syndrome of lumbar region  6. Mixed hyperlipidemia  7. Prediabetes    - Initial weight 182 lb 6.4 oz (82.7 kg), Initial Body mass index is 31.02 kg/m².  -during menopause she gained about 40 lbs  -also she had sciatica since 2019, s/p laminectomy, that did not work for her, and next year she got the other side  - The patient participated in a comprehensive weight management program that encourages behavioral modification, reduced calorie diet and  increased physical activity with continuing follow up for at least 6 months prior to using drug therapy.   -labs with Cr 1.2, GFR 35, normal bmp, HDL 64, A1c 5.8  -Topiramate; patient denies any personal history of Suicide and Kidney stones, glaucoma, pregnancy. Discussed getting annual eye exams to rule out glaucoma.    Plan:    - topiramate 25 MG Oral Tab; Take 1 tablet (25 mg total) by mouth Every afternoon at 2:00 pm.  Dispense: 90 tablet; Refill: 0       Regarding Obesity:  Follow up with dietitian and psychologist as recommended.  Discussed the role of sleep and stress in weight management.  Labs orders as above.  Counseled on comprehensive weight loss plan including attention to nutrition, exercise and behavior/stress management for success. See patient instruction below for more details.  Weight Loss Consent to treat reviewed and signed.    Regarding weight loss Medications.  Medication use and side effects reviewed with patient.    Medication will be used with a reduced calorie diet and increased physical activity in the management of exogenous obesity.  Patient will be responsible to let me know of any side effects or complications with medications.             No follow-ups on file.     Taylor Pendleton MD

## 2025-02-26 DIAGNOSIS — R60.0 LOCALIZED EDEMA: ICD-10-CM

## 2025-03-05 RX ORDER — SPIRONOLACTONE 25 MG/1
TABLET ORAL
Qty: 30 TABLET | Refills: 2 | Status: SHIPPED | OUTPATIENT
Start: 2025-03-05

## 2025-04-07 ENCOUNTER — TELEPHONE (OUTPATIENT)
Dept: SURGERY | Facility: CLINIC | Age: 85
End: 2025-04-07

## (undated) NOTE — LETTER
[facility]  Administration of Anesthesia  1. Jackie Roberts, or _________________________________ acting on her behalf, (Patient) (Dependent/Representative) request to receive anesthesia for my pending procedure/operation/treatment.   DORYS torres bleeding, seizure, cardiac arrest and death. 7. AWARENESS: I understand that it is possible (but unlikely) to have explicit memory of events from the operating room while under general anesthesia.   8. ELECTROCONVULSIVE THERAPY PATIENTS: This consent serve below affirms that prior to the time of the procedure, I have explained to the patient and/or his/her guardian, the risks and benefits of undergoing anesthesia, as well as any reasonable alternatives.     ___________________________________________________

## (undated) NOTE — LETTER
[address]   Date:   1/29/2020     Name:   Kirti Obrien    YOB: 1940   MRN:   [id number]       WHERE IS YOUR PAIN NOW? Uday the areas on your body where you feel the described sensations.   Use the appropriat

## (undated) NOTE — LETTER
[facility] SURGERY SCHEDULING FORM   [address] [place] [address] [place] [address]   [phone] (scheduling phone) [phone] (scheduling fax)     PATIENT INFORMATION   Last Name:      Francesco Handlezechariah      First Name:    Brandon Garcia []  No Anesthesia   [x]  Yes  []  No or using our own   Allergies: Macrobid [Nitrofurantoin]; Pravachol [Pravastatin Sodium]; Sulfa Antibiotics;  Lisinopril         Completed by:    Kristi Estes      Date:    1/29/2020

## (undated) NOTE — LETTER
[address]   Date:   6/9/2021     Name:   Evelene Dandy    YOB: 1940   MRN:   [id number]       WHERE IS YOUR PAIN NOW? Uday the areas on your body where you feel the described sensations.   Use the appropriate